# Patient Record
Sex: FEMALE | Race: ASIAN | NOT HISPANIC OR LATINO | Employment: FULL TIME | URBAN - METROPOLITAN AREA
[De-identification: names, ages, dates, MRNs, and addresses within clinical notes are randomized per-mention and may not be internally consistent; named-entity substitution may affect disease eponyms.]

---

## 2017-05-02 ENCOUNTER — OFFICE VISIT (OUTPATIENT)
Dept: OBSTETRICS AND GYNECOLOGY | Facility: CLINIC | Age: 47
End: 2017-05-02
Payer: COMMERCIAL

## 2017-05-02 VITALS
SYSTOLIC BLOOD PRESSURE: 124 MMHG | BODY MASS INDEX: 26.68 KG/M2 | TEMPERATURE: 99 F | DIASTOLIC BLOOD PRESSURE: 70 MMHG | WEIGHT: 150.56 LBS | HEIGHT: 63 IN

## 2017-05-02 DIAGNOSIS — Z12.31 VISIT FOR SCREENING MAMMOGRAM: ICD-10-CM

## 2017-05-02 DIAGNOSIS — Z01.419 WELL WOMAN EXAM WITH ROUTINE GYNECOLOGICAL EXAM: Primary | ICD-10-CM

## 2017-05-02 PROCEDURE — 88175 CYTOPATH C/V AUTO FLUID REDO: CPT

## 2017-05-02 PROCEDURE — 99386 PREV VISIT NEW AGE 40-64: CPT | Mod: S$GLB,,, | Performed by: OBSTETRICS & GYNECOLOGY

## 2017-05-02 PROCEDURE — 99999 PR PBB SHADOW E&M-NEW PATIENT-LVL III: CPT | Mod: PBBFAC,,, | Performed by: OBSTETRICS & GYNECOLOGY

## 2017-05-02 NOTE — MR AVS SNAPSHOT
"    [facility] - OB/ GYN  [address]  [place] [address]  Phone: [phone]                  Jacque Sanford   2017 11:20 AM   Office Visit    Description:  Female : 1970   Provider:  Anthony Fernandez MD   Department:  [facility] - OB/ GYN           Reason for Visit     Gynecologic Exam           Diagnoses this Visit        Comments    Well woman exam with routine gynecological exam    -  Primary     Visit for screening mammogram                To Do List           Goals (5 Years of Data)     None      Follow-Up and Disposition     Return in about 1 year (around 2018).      [facility]     [facility] On Call Nurse Care Line -  Assistance  Unless otherwise directed by your provider, please contact [facility], our nurse care line that is available for  assistance.     Registered nurses in the [facility] provide: appointment scheduling, clinical advisement, health education, and other advisory services.  Call: [phone] (toll free)               Medications           Message regarding Medications     Verify the changes and/or additions to your medication regime listed below are the same as discussed with your clinician today.  If any of these changes or additions are incorrect, please notify your healthcare provider.             Verify that the below list of medications is an accurate representation of the medications you are currently taking.  If none reported, the list may be blank. If incorrect, please contact your healthcare provider. Carry this list with you in case of emergency.                Clinical Reference Information           Your Vitals Were     BP Temp Height    124/70 (BP Location: Right arm, Patient Position: Sitting, BP Method: Manual) 98.6 °F (37 °C) (Oral) 5' 2.6" (1.59 m)    Weight Last Period BMI    68.3 kg (150 lb 9.2 oz) 04/15/2017 (Exact Date) 27.02 kg/m2      Blood Pressure          Most Recent Value    BP  124/70      Allergies as of " 5/2/2017     No Known Allergies      Immunizations Administered on Date of Encounter - 5/2/2017     None      Orders Placed During Today's Visit      Normal Orders This Visit    Liquid-based pap smear, screening     Future Labs/Procedures Expected by Expires    Mammo Digital Screening Bilat with CAD  5/2/2017 7/2/2018      Language Assistance Services     ATTENTION: Language assistance services are available, free of charge. Please call [phone].      ATENCIÓN: Si habla español, tiene a villarreal disposición servicios gratuitos de asistencia lingüística. Llame al [phone].     [facility] Ý: N?u b?n nói Ti?ng Vi?t, có các d?ch v? h? tr? ngôn ng? mi?n phí dành cho b?n. G?i s? [phone].         [facility] - [facility]/ [facility]YN complies with applicable Federal civil rights laws and does not discriminate on the basis of race, color, national origin, age, disability, or sex.

## 2017-05-02 NOTE — PROGRESS NOTES
Subjective:       Patient ID: Jacque Sanford is a 46 y.o. female.    Chief Complaint:  Gynecologic Exam      History of Present Illness  HPI  Annual Exam-Premenopausal  Patient presents for annual exam. The patient has no complaints today. The patient is sexually active. GYN screening history: no prior history of gyn screening tests. The patient wears seatbelts: yes. The patient participates in regular exercise: yes. Has the patient ever been transfused or tattooed?: no. The patient reports that there is not domestic violence in her life.    She has had an IUD from [place] in .  The device was supposed to last 6 years.         GYN & OB History  Patient's last menstrual period was 04/15/2017 (exact date).   Date of Last Pap: No result found    OB History    Para Term  AB SAB TAB Ectopic Multiple Living   4 2 2  2 2          # Outcome Date GA Lbr Daryl/2nd Weight Sex Delivery Anes PTL Lv   4 Term            3 Term            2 SAB            1 SAB                 History reviewed. No pertinent past medical history.    History reviewed. No pertinent surgical history.    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     No current facility-administered medications for this visit.        Review of patient's allergies indicates:  No Known Allergies    Review of Systems  Review of Systems    Constitutional: Negative for activity change, appetite change, chills, fatigue, fever and unexpected weight change.   HENT: Negative for mouth sores.    Respiratory: Negative for cough, shortness of breath and wheezing.    Cardiovascular: Negative for chest pain and palpitations.   Gastrointestinal: Negative for abdominal pain, bloating, blood in stool, constipation, nausea and vomiting.   Endocrine: Negative for diabetes and hot flashes.   Genitourinary: Negative for dyspareunia, dysuria, frequency, hematuria, menorrhagia, menstrual problem, pelvic pain, urgency, vaginal bleeding, vaginal discharge, vaginal pain, dysmenorrhea, urinary incontinence, postcoital bleeding and vaginal odor.   Musculoskeletal: Negative for back pain and myalgias.   Skin:  Negative for rash.   Neurological: Negative for seizures and headaches.   Psychiatric/Behavioral: Negative for depression and sleep disturbance. The patient is not nervous/anxious.    Breast: Negative for breast mass, breast pain and nipple discharge          Objective:    Physical Exam:   Constitutional: She appears well-developed and well-nourished. No distress.    HENT:   Head: Normocephalic and atraumatic.    Eyes: EOM are normal.    Neck: Normal range of motion.     Pulmonary/Chest: Effort normal. No respiratory distress.   Breasts: Non-tender, no engorgement, no masses, no retraction, no discharge. Negative for lymphadenopathy.         Abdominal: Soft. She exhibits no distension. There is no tenderness. There is no rebound and no guarding.     Genitourinary: Vagina normal and uterus normal. No vaginal discharge found.   Genitourinary Comments: Vulva without any obvious lesions.  Vaginal vault with good support.  Minimal yellow discharge noted.  No obvious lesion.  Cervix is without any cervical motion tenderness.  No obvious lesion.  IUD strings visible.  Uterus is small, retroverted, non-tender, normal contour.  Adnexa is without any masses or tenderness.            Musculoskeletal: Normal range of motion.       Neurological: She is alert.    Skin: Skin is warm and dry.    Psychiatric: She has a normal mood and affect.          Assessment:        1. Well woman exam with routine gynecological exam    2. Visit for screening mammogram       3.  IUD in utero        Plan:          I have discussed with the patient her condition.  Monthly breast examination was instructed, discussed, and encouraged.  Patient was encouraged to consume a low-calorie, low fat diet, and to increase of physical activity.  Healthy habits encouraged.  A Pap smear was performed.  Mammogram was ordered because of the combination of her age and risk factors.  Gonorrhea and Chlamydia testing not performed.  HIV test not ordered.  She will come back to see me in one year for her annual visit.  She can come back to see me sooner as necessary.  All of her questions were answered appropriately to her satisfaction.    She will be back for IUD removal and insertion.

## 2019-04-26 ENCOUNTER — OFFICE VISIT (OUTPATIENT)
Dept: OBSTETRICS AND GYNECOLOGY | Facility: CLINIC | Age: 49
End: 2019-04-26
Payer: COMMERCIAL

## 2019-04-26 ENCOUNTER — HOSPITAL ENCOUNTER (OUTPATIENT)
Dept: RADIOLOGY | Facility: HOSPITAL | Age: 49
Discharge: HOME | End: 2019-04-26
Attending: OBSTETRICS & GYNECOLOGY
Payer: COMMERCIAL

## 2019-04-26 VITALS
BODY MASS INDEX: 27.07 KG/M2 | WEIGHT: 152.75 LBS | SYSTOLIC BLOOD PRESSURE: 120 MMHG | DIASTOLIC BLOOD PRESSURE: 74 MMHG | HEIGHT: 63 IN

## 2019-04-26 DIAGNOSIS — Z01.419 WELL WOMAN EXAM WITH ROUTINE GYNECOLOGICAL EXAM: Primary | ICD-10-CM

## 2019-04-26 DIAGNOSIS — Z12.31 SCREENING MAMMOGRAM, ENCOUNTER FOR: ICD-10-CM

## 2019-04-26 PROCEDURE — 77067 SCR MAMMO BI INCL CAD: CPT | Mod: 26,,, | Performed by: RADIOLOGY

## 2019-04-26 PROCEDURE — 77063 MAMMO DIGITAL SCREENING BILAT WITH TOMOSYNTHESIS_CAD: ICD-10-PCS | Mod: 26,,, | Performed by: RADIOLOGY

## 2019-04-26 PROCEDURE — 77067 SCR MAMMO BI INCL CAD: CPT | Mod: TC

## 2019-04-26 PROCEDURE — 77063 BREAST TOMOSYNTHESIS BI: CPT | Mod: 26,,, | Performed by: RADIOLOGY

## 2019-04-26 PROCEDURE — 99396 PR PREVENTIVE VISIT,EST,40-64: ICD-10-PCS | Mod: S$GLB,,, | Performed by: OBSTETRICS & GYNECOLOGY

## 2019-04-26 PROCEDURE — 99999 PR PBB SHADOW E&M-EST. PATIENT-LVL III: CPT | Mod: PBBFAC,,, | Performed by: OBSTETRICS & GYNECOLOGY

## 2019-04-26 PROCEDURE — 99999 PR PBB SHADOW E&M-EST. PATIENT-LVL III: ICD-10-PCS | Mod: PBBFAC,,, | Performed by: OBSTETRICS & GYNECOLOGY

## 2019-04-26 PROCEDURE — 99396 PREV VISIT EST AGE 40-64: CPT | Mod: S$GLB,,, | Performed by: OBSTETRICS & GYNECOLOGY

## 2019-04-26 PROCEDURE — 77067 MAMMO DIGITAL SCREENING BILAT WITH TOMOSYNTHESIS_CAD: ICD-10-PCS | Mod: 26,,, | Performed by: RADIOLOGY

## 2019-04-26 NOTE — PROGRESS NOTES
"  Subjective:       Patient ID: Jacque Sanford is a 48 y.o. female.    Chief Complaint:  Gynecologic Exam (Last pap was 17)      History of Present Illness  HPI  Annual Exam-Premenopausal  Patient presents for annual exam. The patient has no complaints today. The patient is sexually active. GYN screening history: last pap: approximate date 2017 and was normal. No mammogram on file.  The patient wears seatbelts: yes. The patient participates in regular exercise: yes. Has the patient ever been transfused or tattooed?: no. The patient reports that there is not domestic violence in her life.    Has had an IUD from [place] since .  Prolonged menses up to 10 days every month.   Back to [place] last month.  "The doctor over there told (her) to keep the IUD in until (she) stops having cycle".    No other complaint.      GYN & OB History  Patient's last menstrual period was 2019 (exact date).   Date of Last Pap: 2017    OB History    Para Term  AB Living   4 2 2   2     SAB TAB Ectopic Multiple Live Births   2              # Outcome Date GA Lbr Daryl/2nd Weight Sex Delivery Anes PTL Lv   4 Term            3 Term            2 SAB            1 SAB              History reviewed. No pertinent past medical history.    History reviewed. No pertinent surgical history.    Family History   Problem Relation Age of Onset    Diabetes Father     Hypertension Father        Social History     Socioeconomic History    Marital status:      Spouse name: Not on file    Number of children: Not on file    Years of education: Not on file    Highest education level: Not on file   Occupational History    Not on file   Social Needs    Financial resource strain: Not on file    Food insecurity:     Worry: Not on file     Inability: Not on file    Transportation needs:     Medical: Not on file     Non-medical: Not on file   Tobacco Use    Smoking status: Never Smoker    Smokeless tobacco: Never Used "   Substance and Sexual Activity    Alcohol use: No    Drug use: No    Sexual activity: Yes     Partners: Male     Birth control/protection: IUD   Lifestyle    Physical activity:     Days per week: Not on file     Minutes per session: Not on file    Stress: Not on file   Relationships    Social connections:     Talks on phone: Not on file     Gets together: Not on file     Attends Religion service: Not on file     Active member of club or organization: Not on file     Attends meetings of clubs or organizations: Not on file     Relationship status: Not on file   Other Topics Concern    Not on file   Social History Narrative     since 1993    He is a     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       No current facility-administered medications for this visit.        Review of patient's allergies indicates:  No Known Allergies  Review of Systems  Review of Systems   Constitutional: Negative for activity change, appetite change, chills, fatigue, fever and unexpected weight change.   HENT: Negative for mouth sores.    Respiratory: Negative for cough, shortness of breath and wheezing.    Cardiovascular: Negative for chest pain and palpitations.   Gastrointestinal: Negative for abdominal pain, bloating, blood in stool, constipation, nausea and vomiting.   Endocrine: Negative for diabetes and hot flashes.   Genitourinary: Positive for menstrual problem and vaginal bleeding. Negative for dysmenorrhea, dyspareunia, dysuria, frequency, hematuria, menorrhagia, pelvic pain, urgency, vaginal discharge, vaginal pain, urinary incontinence, postcoital bleeding and vaginal odor.   Musculoskeletal: Negative for back pain and myalgias.   Integumentary:  Negative for rash, breast mass and nipple discharge.   Neurological: Negative for seizures and headaches.    Psychiatric/Behavioral: Negative for depression and sleep disturbance. The patient is not nervous/anxious.    Breast: Negative for mass, mastodynia and nipple discharge          Objective:    Physical Exam:   Constitutional: She appears well-developed and well-nourished. No distress.    HENT:   Head: Normocephalic and atraumatic.    Eyes: EOM are normal.    Neck: Normal range of motion.     Pulmonary/Chest: Effort normal. No respiratory distress.   Breasts: Non-tender, no engorgement, no masses, no retraction, no discharge. Negative for lymphadenopathy.         Abdominal: Soft. She exhibits no distension. There is no tenderness. There is no rebound and no guarding.     Genitourinary: Uterus normal. Vaginal discharge found.   Genitourinary Comments: Vulva without any obvious lesions.  Vaginal vault with good support.  Minimal bloody discharge noted.  No obvious lesion.  Cervix is without any cervical motion tenderness.  No obvious lesion.  IUD strings visible.  Appears to be ParaGard.  Uterus is small, non-tender, normal contour.  Adnexa is without any masses or tenderness.           Musculoskeletal: Normal range of motion.       Neurological: She is alert.    Skin: Skin is warm and dry.    Psychiatric: She has a normal mood and affect.          Assessment:        1. Well woman exam with routine gynecological exam    2. Screening mammogram, encounter for    3.  IUD in uterus         Plan:          I have discussed with the patient her condition.  Monthly breast examination was instructed, discussed, and encouraged.  Patient was encouraged to consume a low-calorie, low fat diet, and to increase of physical activity.  Healthy habits encouraged.  A Pap smear was NOT performed according to the USPSTF recommendations.  Mammogram was ordered because of the combination of her age and risk factors, according to ACOG guidelines.  Gonorrhea and Chlamydia testing not performed;  HIV test not ordered, again according to  guidelines.  Colonoscopy discussed according to ACS guideline.  We also discussed her obstetric history and CV risks.   Patient is to continue her medications as prescribed.  She will come back to see me in one year for her annual visit.  She can come back to see me sooner as necessary.  All of her questions were answered appropriately to her satisfaction.    Most likely, she can keep her IUD until menopause.

## 2021-04-07 ENCOUNTER — OFFICE VISIT (OUTPATIENT)
Dept: PULMONOLOGY | Facility: CLINIC | Age: 51
End: 2021-04-07
Payer: COMMERCIAL

## 2021-04-07 ENCOUNTER — HOSPITAL ENCOUNTER (OUTPATIENT)
Dept: RADIOLOGY | Facility: HOSPITAL | Age: 51
Discharge: HOME | End: 2021-04-07
Attending: INTERNAL MEDICINE
Payer: COMMERCIAL

## 2021-04-07 VITALS
OXYGEN SATURATION: 95 % | BODY MASS INDEX: 29.62 KG/M2 | HEART RATE: 71 BPM | DIASTOLIC BLOOD PRESSURE: 85 MMHG | TEMPERATURE: 98 F | SYSTOLIC BLOOD PRESSURE: 145 MMHG | WEIGHT: 160.94 LBS | HEIGHT: 62 IN

## 2021-04-07 DIAGNOSIS — R09.81 NASAL CONGESTION: ICD-10-CM

## 2021-04-07 DIAGNOSIS — R05.3 CHRONIC COUGH: Primary | ICD-10-CM

## 2021-04-07 DIAGNOSIS — R05.3 CHRONIC COUGH: ICD-10-CM

## 2021-04-07 PROCEDURE — 71046 X-RAY EXAM CHEST 2 VIEWS: CPT | Mod: TC,FY

## 2021-04-07 PROCEDURE — 3008F PR BODY MASS INDEX (BMI) DOCUMENTED: ICD-10-PCS | Mod: CPTII,S$GLB,, | Performed by: INTERNAL MEDICINE

## 2021-04-07 PROCEDURE — 71046 X-RAY EXAM CHEST 2 VIEWS: CPT | Mod: 26,,, | Performed by: RADIOLOGY

## 2021-04-07 PROCEDURE — 99204 PR OFFICE/OUTPT VISIT, NEW, LEVL IV, 45-59 MIN: ICD-10-PCS | Mod: S$GLB,,, | Performed by: INTERNAL MEDICINE

## 2021-04-07 PROCEDURE — 99999 PR PBB SHADOW E&M-EST. PATIENT-LVL IV: ICD-10-PCS | Mod: PBBFAC,,, | Performed by: INTERNAL MEDICINE

## 2021-04-07 PROCEDURE — 1126F PR PAIN SEVERITY QUANTIFIED, NO PAIN PRESENT: ICD-10-PCS | Mod: S$GLB,,, | Performed by: INTERNAL MEDICINE

## 2021-04-07 PROCEDURE — 71046 XR CHEST PA AND LATERAL: ICD-10-PCS | Mod: 26,,, | Performed by: RADIOLOGY

## 2021-04-07 PROCEDURE — 3008F BODY MASS INDEX DOCD: CPT | Mod: CPTII,S$GLB,, | Performed by: INTERNAL MEDICINE

## 2021-04-07 PROCEDURE — 99204 OFFICE O/P NEW MOD 45 MIN: CPT | Mod: S$GLB,,, | Performed by: INTERNAL MEDICINE

## 2021-04-07 PROCEDURE — 1126F AMNT PAIN NOTED NONE PRSNT: CPT | Mod: S$GLB,,, | Performed by: INTERNAL MEDICINE

## 2021-04-07 PROCEDURE — 99999 PR PBB SHADOW E&M-EST. PATIENT-LVL IV: CPT | Mod: PBBFAC,,, | Performed by: INTERNAL MEDICINE

## 2021-04-07 RX ORDER — ALBUTEROL SULFATE 90 UG/1
2 AEROSOL, METERED RESPIRATORY (INHALATION) EVERY 6 HOURS PRN
Qty: 18 G | Refills: 0 | Status: SHIPPED | OUTPATIENT
Start: 2021-04-07 | End: 2023-08-29

## 2021-04-07 RX ORDER — PREDNISONE 20 MG/1
40 TABLET ORAL DAILY
Qty: 10 TABLET | Refills: 0 | Status: SHIPPED | OUTPATIENT
Start: 2021-04-07 | End: 2021-04-12

## 2021-04-07 RX ORDER — PROMETHAZINE HYDROCHLORIDE AND DEXTROMETHORPHAN HYDROBROMIDE 6.25; 15 MG/5ML; MG/5ML
SYRUP ORAL
COMMUNITY
Start: 2021-02-23 | End: 2023-08-29

## 2021-04-07 RX ORDER — ERGOCALCIFEROL 1.25 MG/1
50000 CAPSULE ORAL
COMMUNITY
Start: 2021-02-23

## 2021-04-08 ENCOUNTER — TELEPHONE (OUTPATIENT)
Dept: PULMONOLOGY | Facility: CLINIC | Age: 51
End: 2021-04-08

## 2021-05-03 ENCOUNTER — TELEPHONE (OUTPATIENT)
Dept: OBSTETRICS AND GYNECOLOGY | Facility: CLINIC | Age: 51
End: 2021-05-03

## 2021-05-03 DIAGNOSIS — Z12.31 SCREENING MAMMOGRAM, ENCOUNTER FOR: Primary | ICD-10-CM

## 2021-05-24 ENCOUNTER — HOSPITAL ENCOUNTER (OUTPATIENT)
Dept: RADIOLOGY | Facility: HOSPITAL | Age: 51
Discharge: HOME | End: 2021-05-24
Attending: OBSTETRICS & GYNECOLOGY
Payer: COMMERCIAL

## 2021-05-24 ENCOUNTER — OFFICE VISIT (OUTPATIENT)
Dept: OBSTETRICS AND GYNECOLOGY | Facility: CLINIC | Age: 51
End: 2021-05-24
Payer: COMMERCIAL

## 2021-05-24 VITALS
HEIGHT: 62 IN | DIASTOLIC BLOOD PRESSURE: 60 MMHG | WEIGHT: 162.25 LBS | SYSTOLIC BLOOD PRESSURE: 138 MMHG | BODY MASS INDEX: 29.86 KG/M2

## 2021-05-24 VITALS — WEIGHT: 160.94 LBS | HEIGHT: 62 IN | BODY MASS INDEX: 29.62 KG/M2

## 2021-05-24 DIAGNOSIS — Z12.31 SCREENING MAMMOGRAM, ENCOUNTER FOR: ICD-10-CM

## 2021-05-24 DIAGNOSIS — Z01.419 WELL WOMAN EXAM WITH ROUTINE GYNECOLOGICAL EXAM: Primary | ICD-10-CM

## 2021-05-24 PROCEDURE — 77067 MAMMO DIGITAL SCREENING BILAT WITH TOMO: ICD-10-PCS | Mod: 26,,, | Performed by: RADIOLOGY

## 2021-05-24 PROCEDURE — 77063 BREAST TOMOSYNTHESIS BI: CPT | Mod: 26,,, | Performed by: RADIOLOGY

## 2021-05-24 PROCEDURE — 99999 PR PBB SHADOW E&M-EST. PATIENT-LVL III: CPT | Mod: PBBFAC,,, | Performed by: OBSTETRICS & GYNECOLOGY

## 2021-05-24 PROCEDURE — 88175 CYTOPATH C/V AUTO FLUID REDO: CPT | Performed by: OBSTETRICS & GYNECOLOGY

## 2021-05-24 PROCEDURE — 3008F BODY MASS INDEX DOCD: CPT | Mod: CPTII,S$GLB,, | Performed by: OBSTETRICS & GYNECOLOGY

## 2021-05-24 PROCEDURE — 77067 SCR MAMMO BI INCL CAD: CPT | Mod: 26,,, | Performed by: RADIOLOGY

## 2021-05-24 PROCEDURE — 1126F PR PAIN SEVERITY QUANTIFIED, NO PAIN PRESENT: ICD-10-PCS | Mod: S$GLB,,, | Performed by: OBSTETRICS & GYNECOLOGY

## 2021-05-24 PROCEDURE — 3008F PR BODY MASS INDEX (BMI) DOCUMENTED: ICD-10-PCS | Mod: CPTII,S$GLB,, | Performed by: OBSTETRICS & GYNECOLOGY

## 2021-05-24 PROCEDURE — 1126F AMNT PAIN NOTED NONE PRSNT: CPT | Mod: S$GLB,,, | Performed by: OBSTETRICS & GYNECOLOGY

## 2021-05-24 PROCEDURE — 77063 MAMMO DIGITAL SCREENING BILAT WITH TOMO: ICD-10-PCS | Mod: 26,,, | Performed by: RADIOLOGY

## 2021-05-24 PROCEDURE — 99396 PR PREVENTIVE VISIT,EST,40-64: ICD-10-PCS | Mod: S$GLB,,, | Performed by: OBSTETRICS & GYNECOLOGY

## 2021-05-24 PROCEDURE — 77067 SCR MAMMO BI INCL CAD: CPT | Mod: TC

## 2021-05-24 PROCEDURE — 99999 PR PBB SHADOW E&M-EST. PATIENT-LVL III: ICD-10-PCS | Mod: PBBFAC,,, | Performed by: OBSTETRICS & GYNECOLOGY

## 2021-05-24 PROCEDURE — 99396 PREV VISIT EST AGE 40-64: CPT | Mod: S$GLB,,, | Performed by: OBSTETRICS & GYNECOLOGY

## 2021-05-24 PROCEDURE — 87624 HPV HI-RISK TYP POOLED RSLT: CPT | Performed by: OBSTETRICS & GYNECOLOGY

## 2021-05-28 LAB
FINAL PATHOLOGIC DIAGNOSIS: NORMAL
Lab: NORMAL

## 2021-05-31 LAB
HPV HR 12 DNA SPEC QL NAA+PROBE: NEGATIVE
HPV16 AG SPEC QL: NEGATIVE
HPV18 DNA SPEC QL NAA+PROBE: NEGATIVE

## 2021-08-16 ENCOUNTER — CLINICAL SUPPORT (OUTPATIENT)
Dept: URGENT CARE | Facility: CLINIC | Age: 51
End: 2021-08-16
Payer: COMMERCIAL

## 2021-08-16 DIAGNOSIS — Z20.822 ENCOUNTER FOR LABORATORY TESTING FOR COVID-19 VIRUS: Primary | ICD-10-CM

## 2021-08-16 LAB
CTP QC/QA: YES
SARS-COV-2 RDRP RESP QL NAA+PROBE: NEGATIVE

## 2021-08-16 PROCEDURE — U0002: ICD-10-PCS | Mod: QW,S$GLB,, | Performed by: PHYSICIAN ASSISTANT

## 2021-08-16 PROCEDURE — U0002 COVID-19 LAB TEST NON-CDC: HCPCS | Mod: QW,S$GLB,, | Performed by: PHYSICIAN ASSISTANT

## 2023-07-17 ENCOUNTER — HOSPITAL ENCOUNTER (OUTPATIENT)
Dept: RADIOLOGY | Facility: HOSPITAL | Age: 53
Discharge: HOME | End: 2023-07-17
Attending: ORTHOPAEDIC SURGERY
Payer: COMMERCIAL

## 2023-07-17 ENCOUNTER — OFFICE VISIT (OUTPATIENT)
Dept: SPORTS MEDICINE | Facility: CLINIC | Age: 53
End: 2023-07-17
Payer: COMMERCIAL

## 2023-07-17 VITALS
WEIGHT: 162 LBS | SYSTOLIC BLOOD PRESSURE: 112 MMHG | HEART RATE: 61 BPM | DIASTOLIC BLOOD PRESSURE: 71 MMHG | BODY MASS INDEX: 29.63 KG/M2

## 2023-07-17 DIAGNOSIS — G89.29 CHRONIC PAIN OF BOTH KNEES: ICD-10-CM

## 2023-07-17 DIAGNOSIS — M25.561 CHRONIC PAIN OF BOTH KNEES: ICD-10-CM

## 2023-07-17 DIAGNOSIS — M17.10 PATELLOFEMORAL ARTHRITIS: Primary | ICD-10-CM

## 2023-07-17 DIAGNOSIS — M25.562 CHRONIC PAIN OF BOTH KNEES: ICD-10-CM

## 2023-07-17 PROCEDURE — 99999 PR PBB SHADOW E&M-EST. PATIENT-LVL III: ICD-10-PCS | Mod: PBBFAC,,, | Performed by: ORTHOPAEDIC SURGERY

## 2023-07-17 PROCEDURE — 99204 OFFICE O/P NEW MOD 45 MIN: CPT | Mod: 25,S$GLB,, | Performed by: ORTHOPAEDIC SURGERY

## 2023-07-17 PROCEDURE — 3078F PR MOST RECENT DIASTOLIC BLOOD PRESSURE < 80 MM HG: ICD-10-PCS | Mod: CPTII,S$GLB,, | Performed by: ORTHOPAEDIC SURGERY

## 2023-07-17 PROCEDURE — 4010F ACE/ARB THERAPY RXD/TAKEN: CPT | Mod: CPTII,S$GLB,, | Performed by: ORTHOPAEDIC SURGERY

## 2023-07-17 PROCEDURE — 99204 PR OFFICE/OUTPT VISIT, NEW, LEVL IV, 45-59 MIN: ICD-10-PCS | Mod: 25,S$GLB,, | Performed by: ORTHOPAEDIC SURGERY

## 2023-07-17 PROCEDURE — 73564 XR KNEE ORTHO BILAT WITH FLEXION: ICD-10-PCS | Mod: 26,,, | Performed by: RADIOLOGY

## 2023-07-17 PROCEDURE — 3008F BODY MASS INDEX DOCD: CPT | Mod: CPTII,S$GLB,, | Performed by: ORTHOPAEDIC SURGERY

## 2023-07-17 PROCEDURE — 99999 PR PBB SHADOW E&M-EST. PATIENT-LVL III: CPT | Mod: PBBFAC,,, | Performed by: ORTHOPAEDIC SURGERY

## 2023-07-17 PROCEDURE — 3074F SYST BP LT 130 MM HG: CPT | Mod: CPTII,S$GLB,, | Performed by: ORTHOPAEDIC SURGERY

## 2023-07-17 PROCEDURE — 20610 LARGE JOINT ASPIRATION/INJECTION: L KNEE: ICD-10-PCS | Mod: LT,S$GLB,, | Performed by: ORTHOPAEDIC SURGERY

## 2023-07-17 PROCEDURE — 3008F PR BODY MASS INDEX (BMI) DOCUMENTED: ICD-10-PCS | Mod: CPTII,S$GLB,, | Performed by: ORTHOPAEDIC SURGERY

## 2023-07-17 PROCEDURE — 3074F PR MOST RECENT SYSTOLIC BLOOD PRESSURE < 130 MM HG: ICD-10-PCS | Mod: CPTII,S$GLB,, | Performed by: ORTHOPAEDIC SURGERY

## 2023-07-17 PROCEDURE — 20610 DRAIN/INJ JOINT/BURSA W/O US: CPT | Mod: LT,S$GLB,, | Performed by: ORTHOPAEDIC SURGERY

## 2023-07-17 PROCEDURE — 4010F PR ACE/ARB THEARPY RXD/TAKEN: ICD-10-PCS | Mod: CPTII,S$GLB,, | Performed by: ORTHOPAEDIC SURGERY

## 2023-07-17 PROCEDURE — 73564 X-RAY EXAM KNEE 4 OR MORE: CPT | Mod: TC,50

## 2023-07-17 PROCEDURE — 73564 X-RAY EXAM KNEE 4 OR MORE: CPT | Mod: 26,,, | Performed by: RADIOLOGY

## 2023-07-17 PROCEDURE — 3078F DIAST BP <80 MM HG: CPT | Mod: CPTII,S$GLB,, | Performed by: ORTHOPAEDIC SURGERY

## 2023-07-17 RX ORDER — TRIAMCINOLONE ACETONIDE 40 MG/ML
40 INJECTION, SUSPENSION INTRA-ARTICULAR; INTRAMUSCULAR
Status: DISCONTINUED | OUTPATIENT
Start: 2023-07-17 | End: 2023-07-17 | Stop reason: HOSPADM

## 2023-07-17 RX ORDER — MELOXICAM 15 MG/1
15 TABLET ORAL DAILY
Qty: 30 TABLET | Refills: 0 | Status: SHIPPED | OUTPATIENT
Start: 2023-07-17

## 2023-07-17 RX ADMIN — TRIAMCINOLONE ACETONIDE 40 MG: 40 INJECTION, SUSPENSION INTRA-ARTICULAR; INTRAMUSCULAR at 10:07

## 2023-07-17 NOTE — PROGRESS NOTES
NEW PATIENT    HISTORY OF PRESENT ILLNESS   52 y.o. 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 She states she is on her feet for at least 15 hours a day. She has been having bilateral knee pain for a few months now. She reports some swelling. She has pain while standing and stair climbing. She has not had any previous treatment.        - mechanical symptoms, - instability    Is affecting ADLs.  Pain is 7/10 at it's worst.        PAST MEDICAL HISTORY    No past medical history on file.    PAST SURGICAL HISTORY     No past surgical history on file.    FAMILY HISTORY    Family History   Problem Relation Age of Onset    Diabetes Father     Hypertension Father        SOCIAL HISTORY    Social History     Socioeconomic History    Marital status:    Tobacco Use    Smoking status: Never    Smokeless tobacco: Never   Substance and Sexual Activity    Alcohol use: No    Drug use: No    Sexual activity: Yes     Partners: Male     Birth control/protection: I.U.D.   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 MAY CAUSE DROWSINESS, Disp: , Rfl:     ALLERGIES     Review of patient's allergies indicates:  No Known Allergies      REVIEW OF SYSTEMS   Constitution: Negative. Negative for chills, fever and night sweats.   HENT: Negative for congestion and headaches.    Eyes: Negative for blurred  vision, left vision loss and right vision loss.   Cardiovascular: Negative for chest pain and syncope.   Respiratory: Negative for cough and shortness of breath.    Endocrine: Negative for polydipsia, polyphagia and polyuria.   Hematologic/Lymphatic: Negative for bleeding problem. Does not bruise/bleed easily.   Skin: Negative for dry skin, itching and rash.   Musculoskeletal: Negative for falls. Positive for bilateral knee pain  Gastrointestinal: Negative for abdominal pain and bowel incontinence.   Genitourinary: Negative for bladder incontinence and nocturia.   Neurological: Negative for disturbances in coordination, loss of balance and seizures.   Psychiatric/Behavioral: Negative for depression. The patient does not have insomnia.    Allergic/Immunologic: Negative for hives and persistent infections.     PHYSICAL EXAMINATION    Vitals: /71   Pulse 61   Wt 73.5 kg (162 lb)   BMI 29.63 kg/m²     General: The patient appears active and healthy with no apparent physical problems.  No disturbance of mood or affect is demonstrated. Alert and Oriented.    HEENT: Eyes normal, pupils equally round, nose normal.    Resp: Equal and symmetrical chest rises. No wheezing    CV: Regular rate    Neck: Supple; nonpainful range of motion.    Extremities: no cyanosis, clubbing, edema, or diffuse swelling.  Palpable pulses, good capillary refill of the digits.  No coolness, discoloration, edema or obvious varicosities.    Skin: no lesions noted.    Lymphatic: no detected adenopathy in the upper or lower extremities.    Neurologic: normal mental status, normal reflexes, normal gait and balance.  Patient is alert and oriented to person, place and time.  No flaccidity or spasticity is noted.  No motor or sensory deficits are noted.  Light touch is intact    Orthopaedic:     KNEE EXAM - LEFT    Inspection:   Normal skin color and appearance with no scars, no ecchymosis and no effusion.      ROM:   0° - 145°.      Palpation:    There is no tenderness along medial plica, medial collateral ligament, pes bursa, iliotibial band, lateral collateral ligament, popliteal fossa, patellar tendon, or quadriceps tendon. Tender medial and lateral joint line    Stability: - anterior drawer, - Lachman, - pivot shift and - posterior drawer.      No instability with varus or valgus stress at 0° or 30°. Negative dial  test at 30° and 90°.    Tests:   - Elzbietas test.  - patellar compression - grind test, + patellofemoral crepitus.  There is no patellar apprehension.     - J Sign. - Karlee's. - patellar tilt. - June. Lateral patella translation  2 quadrants. Medial patella translation 2 quadrants    Motor:   Quadriceps strength is 5/5 and hamstrings strength is 5/5. Hamstrings show no tightness.      Neuro:   Distal neurovascular status is normal    Vascular: Negative Homans and no palpable popliteal cords. 2+ pedal pulse with brisk cap refill    Gait Normal    KNEE EXAM - RIGHT    Inspection:   Normal skin color and appearance with no scars, no ecchymosis and no effusion.      ROM:   0° - 145°.      Palpation:   There is no tenderness along medial plica, medial collateral ligament, pes bursa, iliotibial band, lateral collateral ligament, popliteal fossa, patellar tendon, or quadriceps tendon. Tender medial and lateral joint line    Stability: - anterior drawer, - Lachman, - pivot shift and - posterior drawer.      No instability with varus or valgus stress at 0° or 30°. Negative dial  test at 30° and 90°.    Tests:   - Elzbietas test.  - patellar compression - grind test, + patellofemoral crepitus.  There is no patellar apprehension.     - J Sign. - Karlee's. - patellar tilt. - June. Lateral patella translation  2 quadrants. Medial patella translation 2 quadrants    Motor:   Quadriceps strength is 5/5 and hamstrings strength is 5/5. Hamstrings show no tightness.      Neuro:   Distal neurovascular status is normal    Vascular: Negative Homans and no  palpable popliteal cords. 2+ pedal pulse with brisk cap refill    Gait Normal    IMAGING    X-ray Knee Ortho Bilateral with Flexion  Narrative: EXAMINATION:  XR KNEE ORTHO BILAT WITH FLEXION    CLINICAL HISTORY:  Pain in right knee    TECHNIQUE:  AP standing of both knees, PA flexion standing views of both knees, and Merchant views of both knees were performed.  Lateral views of both knees were also performed.    COMPARISON:  None    FINDINGS:  Joint spaces are maintained.  Bony structures are intact and no bony abnormalities seen.  Impression: See above    Electronically signed by: Frandy Gomez MD  Date:    07/17/2023  Time:    10:46        IMPRESSION       ICD-10-CM ICD-9-CM   1. Patellofemoral arthritis  M17.10 716.96   2. Chronic pain of both knees  M25.561 719.46    M25.562 338.29    G89.29        MEDICATIONS PRESCRIBED      Mobic 15mg    RECOMMENDATIONS     CSI of left knee performed in clinic  Referral to physical therapy  RTC in 6 weeks with Giovanni Coombs PA-C    Large Joint Aspiration/Injection: L knee    Date/Time: 7/17/2023 10:00 AM  Performed by: Fan Ruiz MD  Authorized by: Fan Ruiz MD     Consent Done?:  Yes (Verbal)  Indications:  Pain  Site marked: the procedure site was marked    Timeout: prior to procedure the correct patient, procedure, and site was verified    Prep: patient was prepped and draped in usual sterile fashion      Local anesthesia used?: Yes    Local anesthetic:  Co-phenylcaine spray (0.2% Naropin)  Anesthetic total (ml):  4      Details:  Needle Size:  22 G  Ultrasonic Guidance for needle placement?: No    Approach:  Anterolateral  Location:  Knee  Site:  L knee  Medications:  40 mg triamcinolone acetonide 40 mg/mL  Medications comment:  5 mL LIDOcaine HCL 10 mg/ml (1%) 10 mg/mL (1 %)  Patient tolerance:  Patient tolerated the procedure well with no immediate complications        All questions were answered, pt will contact us for questions or concerns in the  interim.

## 2023-07-27 ENCOUNTER — HOSPITAL ENCOUNTER (EMERGENCY)
Facility: HOSPITAL | Age: 53
Discharge: HOME | End: 2023-07-28
Attending: EMERGENCY MEDICINE
Payer: COMMERCIAL

## 2023-07-27 DIAGNOSIS — N70.11 HYDROSALPINX: ICD-10-CM

## 2023-07-27 DIAGNOSIS — R50.9 FEVER: ICD-10-CM

## 2023-07-27 DIAGNOSIS — U07.1 COVID-19: Primary | ICD-10-CM

## 2023-07-27 DIAGNOSIS — R63.0 DECREASED APPETITE: ICD-10-CM

## 2023-07-27 DIAGNOSIS — R82.998 LEUKOCYTES IN URINE: ICD-10-CM

## 2023-07-27 DIAGNOSIS — R11.0 NAUSEA: ICD-10-CM

## 2023-07-27 DIAGNOSIS — N30.01 ACUTE CYSTITIS WITH HEMATURIA: ICD-10-CM

## 2023-07-27 DIAGNOSIS — R10.9 ABDOMINAL PAIN, UNSPECIFIED ABDOMINAL LOCATION: ICD-10-CM

## 2023-07-27 DIAGNOSIS — K52.9 ILEITIS: ICD-10-CM

## 2023-07-27 LAB
ALBUMIN SERPL-MCNC: 4 G/DL (ref 3.3–5.5)
ALBUMIN SERPL-MCNC: 4.1 G/DL (ref 3.3–5.5)
ALLENS TEST: ABNORMAL
ALP SERPL-CCNC: 79 U/L (ref 42–141)
ALP SERPL-CCNC: 91 U/L (ref 42–141)
B-HCG UR QL: NEGATIVE
BILIRUB SERPL-MCNC: 0.8 MG/DL (ref 0.2–1.6)
BILIRUB SERPL-MCNC: 0.9 MG/DL (ref 0.2–1.6)
BILIRUBIN, POC UA: NEGATIVE
BLOOD, POC UA: ABNORMAL
BUN SERPL-MCNC: 20 MG/DL (ref 7–22)
CALCIUM SERPL-MCNC: 9.8 MG/DL (ref 8–10.3)
CHLORIDE SERPL-SCNC: 104 MMOL/L (ref 98–108)
CLARITY, POC UA: ABNORMAL
COLOR, POC UA: YELLOW
CREAT SERPL-MCNC: 0.9 MG/DL (ref 0.6–1.2)
CTP QC/QA: YES
CTP QC/QA: YES
GLUCOSE SERPL-MCNC: 129 MG/DL (ref 73–118)
GLUCOSE, POC UA: NEGATIVE
HCO3 UR-SCNC: 24.6 MMOL/L (ref 24–28)
HCT, POC: NORMAL
HGB, POC: NORMAL (ref 14–18)
INFLUENZA A ANTIGEN, POC: NEGATIVE
INFLUENZA B ANTIGEN, POC: NEGATIVE
KETONES, POC UA: NEGATIVE
LDH SERPL L TO P-CCNC: 1.12 MMOL/L (ref 0.5–2.2)
LEUKOCYTE EST, POC UA: ABNORMAL
MCH, POC: NORMAL
MCHC, POC: NORMAL
MCV, POC: NORMAL
MPV, POC: NORMAL
NITRITE, POC UA: NEGATIVE
PCO2 BLDA: 31.9 MMHG (ref 35–45)
PH SMN: 7.5 [PH] (ref 7.35–7.45)
PH UR STRIP: 7.5 [PH]
PO2 BLDA: 31 MMHG (ref 40–60)
POC ALT (SGPT): 47 U/L (ref 10–47)
POC ALT (SGPT): 65 U/L (ref 10–47)
POC AMYLASE: 33 U/L (ref 14–97)
POC AST (SGOT): 54 U/L (ref 11–38)
POC AST (SGOT): 57 U/L (ref 11–38)
POC BE: 2 MMOL/L
POC GGT: 43 U/L (ref 5–65)
POC PLATELET COUNT: NORMAL
POC RAPID STREP A: NEGATIVE
POC SATURATED O2: 66 % (ref 95–100)
POC TCO2: 25 MMOL/L (ref 18–33)
POC TCO2: 26 MMOL/L (ref 24–29)
POTASSIUM BLD-SCNC: 4.1 MMOL/L (ref 3.6–5.1)
PROTEIN, POC UA: ABNORMAL
PROTEIN, POC: 8.1 G/DL (ref 6.4–8.1)
PROTEIN, POC: 8.3 G/DL (ref 6.4–8.1)
RBC, POC: NORMAL
RDW, POC: NORMAL
SAMPLE: ABNORMAL
SARS-COV-2 RDRP RESP QL NAA+PROBE: POSITIVE
SITE: ABNORMAL
SODIUM BLD-SCNC: 137 MMOL/L (ref 128–145)
SPECIFIC GRAVITY, POC UA: 1.01
UROBILINOGEN, POC UA: 1 E.U./DL
WBC, POC: NORMAL

## 2023-07-27 PROCEDURE — 96361 HYDRATE IV INFUSION ADD-ON: CPT | Mod: ER

## 2023-07-27 PROCEDURE — 81514 NFCT DS BV&VAGINITIS DNA ALG: CPT | Performed by: EMERGENCY MEDICINE

## 2023-07-27 PROCEDURE — 87591 N.GONORRHOEAE DNA AMP PROB: CPT | Performed by: EMERGENCY MEDICINE

## 2023-07-27 PROCEDURE — 82150 ASSAY OF AMYLASE: CPT | Mod: ER

## 2023-07-27 PROCEDURE — 25000003 PHARM REV CODE 250: Mod: ER | Performed by: EMERGENCY MEDICINE

## 2023-07-27 PROCEDURE — 63600175 PHARM REV CODE 636 W HCPCS: Mod: ER | Performed by: EMERGENCY MEDICINE

## 2023-07-27 PROCEDURE — 82040 ASSAY OF SERUM ALBUMIN: CPT | Mod: 59,ER

## 2023-07-27 PROCEDURE — 25000003 PHARM REV CODE 250: Mod: ER

## 2023-07-27 PROCEDURE — 80053 COMPREHEN METABOLIC PANEL: CPT | Mod: ER

## 2023-07-27 PROCEDURE — 87040 BLOOD CULTURE FOR BACTERIA: CPT | Performed by: EMERGENCY MEDICINE

## 2023-07-27 PROCEDURE — 87086 URINE CULTURE/COLONY COUNT: CPT | Performed by: EMERGENCY MEDICINE

## 2023-07-27 PROCEDURE — 96375 TX/PRO/DX INJ NEW DRUG ADDON: CPT | Mod: ER

## 2023-07-27 PROCEDURE — 87635 SARS-COV-2 COVID-19 AMP PRB: CPT | Mod: ER

## 2023-07-27 PROCEDURE — 99285 EMERGENCY DEPT VISIT HI MDM: CPT | Mod: 25,ER

## 2023-07-27 PROCEDURE — 87804 INFLUENZA ASSAY W/OPTIC: CPT | Mod: ER

## 2023-07-27 PROCEDURE — 96365 THER/PROPH/DIAG IV INF INIT: CPT | Mod: 59,ER

## 2023-07-27 PROCEDURE — 81025 URINE PREGNANCY TEST: CPT | Mod: ER

## 2023-07-27 PROCEDURE — 25500020 PHARM REV CODE 255: Mod: ER

## 2023-07-27 PROCEDURE — 85025 COMPLETE CBC W/AUTO DIFF WBC: CPT | Mod: ER

## 2023-07-27 PROCEDURE — 83605 ASSAY OF LACTIC ACID: CPT | Mod: ER

## 2023-07-27 RX ORDER — IBUPROFEN 600 MG/1
600 TABLET ORAL
Status: COMPLETED | OUTPATIENT
Start: 2023-07-27 | End: 2023-07-27

## 2023-07-27 RX ORDER — MORPHINE SULFATE 4 MG/ML
4 INJECTION, SOLUTION INTRAMUSCULAR; INTRAVENOUS
Status: COMPLETED | OUTPATIENT
Start: 2023-07-27 | End: 2023-07-27

## 2023-07-27 RX ORDER — ACETAMINOPHEN 500 MG
1000 TABLET ORAL
Status: COMPLETED | OUTPATIENT
Start: 2023-07-27 | End: 2023-07-27

## 2023-07-27 RX ORDER — ONDANSETRON 2 MG/ML
4 INJECTION INTRAMUSCULAR; INTRAVENOUS
Status: COMPLETED | OUTPATIENT
Start: 2023-07-27 | End: 2023-07-27

## 2023-07-27 RX ADMIN — ONDANSETRON 4 MG: 2 INJECTION INTRAMUSCULAR; INTRAVENOUS at 11:07

## 2023-07-27 RX ADMIN — IBUPROFEN 600 MG: 600 TABLET ORAL at 06:07

## 2023-07-27 RX ADMIN — CEFTRIAXONE 2 G: 2 INJECTION, POWDER, FOR SOLUTION INTRAMUSCULAR; INTRAVENOUS at 11:07

## 2023-07-27 RX ADMIN — MORPHINE SULFATE 4 MG: 4 INJECTION INTRAVENOUS at 11:07

## 2023-07-27 RX ADMIN — ACETAMINOPHEN 1000 MG: 500 TABLET, FILM COATED ORAL at 06:07

## 2023-07-27 RX ADMIN — SODIUM CHLORIDE 1000 ML: 9 INJECTION, SOLUTION INTRAVENOUS at 09:07

## 2023-07-27 RX ADMIN — IOHEXOL 100 ML: 350 INJECTION, SOLUTION INTRAVENOUS at 09:07

## 2023-07-28 VITALS
HEART RATE: 65 BPM | SYSTOLIC BLOOD PRESSURE: 108 MMHG | RESPIRATION RATE: 20 BRPM | WEIGHT: 150 LBS | DIASTOLIC BLOOD PRESSURE: 64 MMHG | TEMPERATURE: 97 F | BODY MASS INDEX: 27.44 KG/M2 | OXYGEN SATURATION: 100 %

## 2023-07-28 LAB
ALLENS TEST: ABNORMAL
HCO3 UR-SCNC: 25.9 MMOL/L (ref 24–28)
LDH SERPL L TO P-CCNC: 1.28 MMOL/L (ref 0.5–2.2)
PCO2 BLDA: 47.5 MMHG (ref 35–45)
PH SMN: 7.34 [PH] (ref 7.35–7.45)
PO2 BLDA: 34 MMHG (ref 40–60)
POC BE: 0 MMOL/L
POC SATURATED O2: 62 % (ref 95–100)
POC TCO2: 27 MMOL/L (ref 24–29)
SAMPLE: ABNORMAL
SITE: ABNORMAL

## 2023-07-28 PROCEDURE — 83605 ASSAY OF LACTIC ACID: CPT | Mod: ER

## 2023-07-28 PROCEDURE — 25000003 PHARM REV CODE 250: Mod: ER | Performed by: EMERGENCY MEDICINE

## 2023-07-28 RX ORDER — ONDANSETRON 4 MG/1
4 TABLET, ORALLY DISINTEGRATING ORAL EVERY 4 HOURS PRN
Qty: 20 TABLET | Refills: 1 | Status: SHIPPED | OUTPATIENT
Start: 2023-07-28

## 2023-07-28 RX ORDER — CEPHALEXIN 500 MG/1
500 CAPSULE ORAL EVERY 8 HOURS
Qty: 30 CAPSULE | Refills: 0 | Status: SHIPPED | OUTPATIENT
Start: 2023-07-28 | End: 2023-08-07

## 2023-07-28 RX ORDER — ESOMEPRAZOLE MAGNESIUM 40 MG/1
40 CAPSULE, DELAYED RELEASE ORAL
Qty: 30 CAPSULE | Refills: 1 | Status: SHIPPED | OUTPATIENT
Start: 2023-07-28 | End: 2024-07-27

## 2023-07-28 RX ORDER — AMOXICILLIN 250 MG
2 CAPSULE ORAL DAILY PRN
Qty: 30 TABLET | Refills: 1 | Status: SHIPPED | OUTPATIENT
Start: 2023-07-28

## 2023-07-28 RX ORDER — DOXYCYCLINE 100 MG/1
100 CAPSULE ORAL 2 TIMES DAILY
Qty: 28 CAPSULE | Refills: 0 | Status: SHIPPED | OUTPATIENT
Start: 2023-07-28 | End: 2023-08-11

## 2023-07-28 RX ORDER — IBUPROFEN 600 MG/1
600 TABLET ORAL EVERY 6 HOURS PRN
Qty: 30 TABLET | Refills: 1 | OUTPATIENT
Start: 2023-07-28 | End: 2023-07-31

## 2023-07-28 RX ORDER — DOXYCYCLINE HYCLATE 100 MG
100 TABLET ORAL
Status: COMPLETED | OUTPATIENT
Start: 2023-07-28 | End: 2023-07-28

## 2023-07-28 RX ADMIN — DOXYCYCLINE HYCLATE 100 MG: 100 TABLET, COATED ORAL at 12:07

## 2023-07-28 NOTE — ED PROVIDER NOTES
Encounter Date: 7/27/2023       History     Chief Complaint   Patient presents with    Abdominal Pain    Fever     Pt presents to the ED with C/C of runny nose, fever, and abdominal pain, onset 2 days ago     51 yo female presents via friend with fever, chills, sweats, runny nose, and abdominal pain.  Patient had onset of fever, chills, sweats, and runny nose about 10 days ago, but developed acute severe generalized abdominal pain around 2 days ago.  Patient has been nauseated and has not wanted to eat for > 1 week, but she has not vomited.  She has not had a BM in 7 days, which she attributes to decreased food intake.  No urinary complaints.  No vaginal discharge.  Sexually active with  only; they have intercourse maybe every 1-2 months since patient is in menopause.  Patient has copper IUD placed 2010.  She is UTD on Pap smear; OB/GYN is Dr. Anthony Fernandez.     Patient is Kyrgyz-speaking.  Friend translated initially, but subsequently, [employer] translation used: [place], #[id number], and Ban, #[id number].      OB/GYN: Dr. Anthony Fernandez  PMD: Dr. Laurent Ramos      Review of patient's allergies indicates:  No Known Allergies  No past medical history on file.  No past surgical history on file.  Family History   Problem Relation Age of Onset    Diabetes Father     Hypertension Father      Social History     Tobacco Use    Smoking status: Never    Smokeless tobacco: Never   Substance Use Topics    Alcohol use: No    Drug use: No     Review of Systems   Constitutional:  Positive for appetite change, chills and fever.   HENT:  Positive for rhinorrhea.    Eyes:  Negative for photophobia.   Respiratory:  Negative for shortness of breath.    Cardiovascular:  Negative for chest pain.   Gastrointestinal:  Positive for abdominal pain and nausea. Negative for vomiting.   Genitourinary:  Negative for dysuria.   Musculoskeletal:  Negative for gait problem.   Skin:  Negative for rash.   Neurological:  Negative for syncope.     Physical Exam      Initial Vitals [07/27/23 1813]   BP Pulse Resp Temp SpO2   (!) 141/81 91 (!) 24 (!) 102.6 °F (39.2 °C) 98 %      MAP       --         Physical Exam    Nursing note and vitals reviewed.  Constitutional: She appears well-developed and well-nourished. She is not diaphoretic.   Awake, alert middle-aged female. Appears tired.   HENT:   Head: Normocephalic and atraumatic.   Eyes: Conjunctivae and EOM are normal. Pupils are equal, round, and reactive to light.   Neck: Neck supple.   Normal range of motion.  Cardiovascular:  Normal rate, regular rhythm and intact distal pulses.           Pulmonary/Chest: Breath sounds normal. No respiratory distress. She has no wheezes. She has no rhonchi. She has no rales.   Abdominal: Abdomen is soft. There is abdominal tenderness (diffuse, moderate). There is no rebound and no guarding.   Genitourinary:    Genitourinary Comments: Normal external genitalia without lesion. Cervical os with IUD string. Physiologic discharge. No adnexal tenderness or masses.     Musculoskeletal:         General: No tenderness or edema. Normal range of motion.      Cervical back: Normal range of motion and neck supple.     Neurological: She is alert and oriented to person, place, and time. She has normal strength.   Moving all extremities.   Skin: Skin is warm and dry. No erythema. No pallor.   Razor-thin shallow 3mm lacerations c/w cutting to face and abdomen.  Friend says this is from a procedure like cupping.   Psychiatric: She has a normal mood and affect.       ED Course   Procedures  Labs Reviewed   SARS-COV-2 RDRP GENE - Abnormal; Notable for the following components:       Result Value    POC Rapid COVID Positive (*)     All other components within normal limits    Narrative:     This test utilizes isothermal nucleic acid amplification technology to detect the SARS-CoV-2 RdRp nucleic acid segment. The analytical sensitivity (limit of detection) is 500 copies/swab.     A POSITIVE result is  indicative of the presence of SARS-CoV-2 RNA; clinical correlation with patient history and other diagnostic information is necessary to determine patient infection status.    A NEGATIVE result means that SARS-CoV-2 nucleic acids are not present above the limit of detection. A NEGATIVE result should be treated as presumptive. It does not rule out the possibility of COVID-19 and should not be the sole basis for treatment decisions. If COVID-19 is strongly suspected based on clinical and exposure history, re-testing using an alternate molecular assay should be considered.     This test is only for use under the Food and Drug Administration s Emergency Use Authorization (EUA).     Commercial kits are provided by [employer]. Performance characteristics of the EUA have been independently verified by [facility] Department of Pathology and Laboratory Medicine.   _________________________________________________________________   The authorized Fact Sheet for Healthcare Providers and the authorized Fact Sheet for Patients of the ID NOW COVID-19 are available on the FDA website:    https://www.fda.gov/media/914483/download      https://www.fda.gov/media/341596/download      POCT URINALYSIS W/O SCOPE - Abnormal; Notable for the following components:    Blood, UA 1+ (*)     Protein, UA 1+ (*)     Leukocytes, UA 3+ (*)     All other components within normal limits   ISTAT PROCEDURE - Abnormal; Notable for the following components:    POC PH 7.496 (*)     POC PCO2 31.9 (*)     POC PO2 31 (*)     POC SATURATED O2 66 (*)     All other components within normal limits   POCT CMP - Abnormal; Notable for the following components:    ALT (SGPT), POC 65 (*)     AST (SGOT), POC 57 (*)     POC Glucose 129 (*)     Protein, POC 8.3 (*)     All other components within normal limits   POCT LIVER PANEL - Abnormal; Notable for the following components:    AST (SGOT), POC 54 (*)     All other components within normal limits    ISTAT PROCEDURE - Abnormal; Notable for the following components:    POC PH 7.345 (*)     POC PCO2 47.5 (*)     POC PO2 34 (*)     POC SATURATED O2 62 (*)     All other components within normal limits   CULTURE, BLOOD    Narrative:     Aerobic and anaerobic   CULTURE, BLOOD    Narrative:     Aerobic and anaerobic   CULTURE, URINE    Narrative:     Indicated criteria for high risk culture:->Other  Other (specify):->UA   C. TRACHOMATIS/N. GONORRHOEAE BY AMP DNA   VAGINOSIS SCREEN BY DNA PROBE   POCT URINE PREGNANCY   POCT URINALYSIS W/O SCOPE   POCT CBC   POCT STREP A, RAPID   POCT RAPID INFLUENZA A/B   POCT CMP   POCT LIVER PANEL          Imaging Results              US Pelvis Comp with Transvag NON-OB (xpd) (Final result)  Result time 07/28/23 00:09:54   Procedure changed from US Pelvis Complete Non OB     Final result by Burton Jeffries MD (07/28/23 00:09:54)                   Impression:      Findings suggestive of right-sided hydrosalpinx versus pyosalpinx, similar when compared with recent CT.  Correlation with physical exam and risk factors would be helpful to exclude pyosalpinx in this patient with fever.  Suggest gynecologic follow-up.    No right-sided ovarian torsion.    IUD in place.    Nonvisualization of the left ovary.      Electronically signed by: Burton Jeffries MD  Date:    07/28/2023  Time:    00:09               Narrative:    EXAMINATION:  US PELVIS COMP WITH TRANSVAG NON-OB (XPD)    CLINICAL HISTORY:  pain, ct stated hydrosalpinx;    COMPARISON:  CT, 07/27/2023.    TECHNIQUE:  Transabdominal and transvaginal pelvic ultrasound were performed utilizing grayscale ultrasound and color Doppler.    FINDINGS:  Uterus measures 7.1 x 4.1 x 4.9 cm.  No focal abnormality.  The endometrial stripe is not thickened measuring 0.2 cm.  Intrauterine device is present.    Left ovary not visualized despite multiple attempts.  Right ovary measures approximately 2.2 x 1.8 x 2.2 cm.  There is probable adjacent  "hydrosalpinx which measures approximately 1.1 cm in maximum diameter.  There is possible mild wall thickening of the fallopian tube with minimal low level internal echoes.  No intralesional vascularity identified within the presumed right hydrosalpinx.  Normal arterial and venous waveforms in the right ovary.    There is trace free fluid within the pelvis.                                       X-Ray Chest AP Portable (Final result)  Result time 07/27/23 22:26:56      Final result by Burton Jeffries MD (07/27/23 22:26:56)                   Impression:      No acute cardiopulmonary finding identified on this single view.      Electronically signed by: Burton Jeffries MD  Date:    07/27/2023  Time:    22:26               Narrative:    EXAMINATION:  XR CHEST AP PORTABLE    CLINICAL HISTORY:  Provided history is "Sepsis;  ".    TECHNIQUE:  One view of the chest.    COMPARISON:  04/07/2021.    FINDINGS:  Cardiac wires overlie the chest.  Cardiomediastinal silhouette is not enlarged.  Lung volumes are low, accentuating basilar markings.  No confluent area of consolidation.  No sizable pleural effusion.  No pneumothorax.                                        CT Abdomen Pelvis With Contrast (Final result)  Result time 07/27/23 22:31:01      Final result by Mumtaz Hernández MD (07/27/23 22:31:01)                   Impression:      1. Tubular fluid-filled structure in the right adnexa with mild wall thickening and enhancement suggesting hydrosalpinx with possible associated inflammatory/infectious process.  No mass is identified.  Follow-up recommended.  2. There is mild wall thickening and enhancement of adjacent distal small bowel which could represent primary or secondary ileitis.  Follow-up recommended.  3. Intrauterine device present.  4. See above comments also.  5.  This report was flagged in Epic as abnormal.      Electronically signed by: Mumtaz Hernández  Date:    07/27/2023  Time:    22:31               Narrative: "    EXAMINATION:  CT ABDOMEN PELVIS WITH CONTRAST    CLINICAL HISTORY:  Abdominal pain, acute, nonlocalized;    TECHNIQUE:  Low dose axial images, sagittal and coronal reformations were obtained from the lung bases to the pubic symphysis following the IV administration of 100 mL of Omnipaque 350 .  Oral contrast was not administered.    COMPARISON:  None.    FINDINGS:  Abdomen:    - Lower thorax:    - Lung bases: No infiltrates and no nodules.    - Liver: No focal mass.    - Gallbladder: No calcified gallstones.    - Bile Ducts: No evidence of intra or extra hepatic biliary ductal dilation.    - Spleen: Negative.    - Kidneys: No mass or hydronephrosis.    - Adrenals: Unremarkable.    - Pancreas: No mass or peripancreatic fat stranding.    - Retroperitoneum:  No significant adenopathy.    - Vascular: No abdominal aortic aneurysm.    - Abdominal wall:  Unremarkable.    Pelvis:    The urinary bladder is within normal limits.    The uterus is midline.  Intrauterine device is noted.    Tubular serpiginous fluid-filled structure in the right adnexa with mild wall thickening and enhancement suggesting hydrosalpinx.  Mild stranding.    No mass or abscess is detected.    There is mild wall thickening and enhancement of adjacent distal small bowel also.    Bowel/Mesentery:    No evidence of bowel obstruction.    The appendix is within normal limits.  The distal margin extends to the right adnexal process though not definitely involved    Bones:  No acute osseous abnormality and no suspicious lytic or blastic lesion.                                       Medications   ibuprofen tablet 600 mg (600 mg Oral Given 7/27/23 1833)   acetaminophen tablet 1,000 mg (1,000 mg Oral Given 7/27/23 1833)   sodium chloride 0.9% bolus 1,000 mL 1,000 mL (0 mLs Intravenous Stopped 7/27/23 2221)   iohexoL (OMNIPAQUE 350) injection 100 mL (100 mLs Intravenous Given 7/27/23 2137)   cefTRIAXone (ROCEPHIN) 2 g in dextrose 5 % in water (D5W) 100 mL  "IVPB (MB+) (0 g Intravenous Stopped 7/28/23 0005)   morphine injection 4 mg (4 mg Intravenous Given 7/27/23 2348)   ondansetron injection 4 mg (4 mg Intravenous Given 7/27/23 2349)   doxycycline tablet 100 mg (100 mg Oral Given 7/28/23 0045)     Medical Decision Making:   History:   Old Medical Records: I decided to obtain old medical records.  Old Records Summarized: records from clinic visits.  Initial Assessment:   52 y.o. female with fever, chills, sweats, runny nose, and abdominal pain.  Differential Diagnosis:   Ddx includes viral URI such as flu or COVID-19, UTI, pyelonephritis, intra-abdominal abscess, other.  Independently Interpreted Test(s):   I have ordered and independently interpreted X-rays - see prior notes.  I have ordered and independently interpreted EKG Reading(s) - see prior notes  Clinical Tests:   Lab Tests: Ordered and Reviewed  Radiological Study: Reviewed and Ordered  Medical Tests: Ordered and Reviewed  ED Management:  Patient received ibuprofen and APAP via triage with improvement in fever.    POC CBC: WBC 11.9.  Hgb 13.3.  Platelets 200.      POC CMP reassuring. Minimal elevation of AST and ALT of unclear significance. UA 3+ leukocytes, no nitrites. Lactate normal x 2.      COVID-19 positive.      CT abdom/pelvis with contrast:  "1. Tubular fluid-filled structure in the right adnexa with mild wall thickening and enhancement suggesting hydrosalpinx with possible associated inflammatory/infectious process.  No mass is identified.  2. There is mild wall thickening and enhancement of adjacent distal small bowel which could represent primary or secondary ileitis."    I then checked ultrasound of pelvis: "Findings suggestive of right-sided hydrosalpinx versus pyosalpinx."    I discussed hydrosalpinx vs pyosalpinx with OB/GYN on call, Dr. Karen Correia.  Since the hydrosalpinx measures 1.1cm in diameter, Dr. Correia thinks it is not the main cause of the patient's pain.  She " recommended treatment with Doxycycline 100mg PO BID x 14 days and f/u to OB/GYN.  I started Doxycycline in ER.    I also treated the patient with IV rocephin 2g in ER and have rx'ed her Keflex 500mg PO BID x 10 days for leukocytes in urine / questionable UTI.      I suspect the ileitis is the primary cause of patient's abdominal discomfort, and that this is related to her COVID-19.  I treated her with IV NS 1L, IV zofran 4mg, and IV morphine 4mg, in addition to APAP and ibuprofen.  Patient had improvement in pain with this.  She also received the abx discussed.    I discussed findings of workup with patient and son via .  I discussed need to take abx and also need to f/u to OB/GYN.    I messaged Dr. Fernandez and Dr. Ramos in Epic regarding this visit.    Other:   I have discussed this case with another health care provider.                        Clinical Impression:   Final diagnoses:  [R50.9] Fever  [U07.1] COVID-19 (Primary)  [K52.9] Ileitis  [R82.998] Leukocytes in urine  [N30.01] Acute cystitis with hematuria  [N70.11] Hydrosalpinx  [R10.9] Abdominal pain, unspecified abdominal location  [R11.0] Nausea  [R63.0] Decreased appetite        ED Disposition Condition    Discharge Stable          ED Prescriptions       Medication Sig Dispense Start Date End Date Auth. Provider    doxycycline (VIBRAMYCIN) 100 MG Cap Take 1 capsule (100 mg total) by mouth 2 (two) times daily. for 14 days 28 capsule 7/28/2023 8/11/2023 Edilma Russo MD    cephALEXin (KEFLEX) 500 MG capsule Take 1 capsule (500 mg total) by mouth every 8 (eight) hours. for 10 days 30 capsule 7/28/2023 8/7/2023 Edilma Russo MD    esomeprazole (NEXIUM) 40 MG capsule Take 1 capsule (40 mg total) by mouth before breakfast. Take on an empty stomach first thing in the morning.  You may drink water with this medication but do not eat or drink anything else with it. 30 capsule 7/28/2023 7/27/2024 Edilma Russo MD    ibuprofen (ADVIL,MOTRIN)  600 MG tablet Take 1 tablet (600 mg total) by mouth every 6 (six) hours as needed for Pain. 30 tablet 7/28/2023 -- Edilma Russo MD    ondansetron (ZOFRAN-ODT) 4 MG TbDL Take 1 tablet (4 mg total) by mouth every 4 (four) hours as needed (nausea/vomiting). 20 tablet 7/28/2023 -- Edilma Russo MD    senna-docusate 8.6-50 mg (PERICOLACE) 8.6-50 mg per tablet Take 2 tablets by mouth daily as needed for Constipation. 30 tablet 7/28/2023 -- Edilma Russo MD          Follow-up Information       Follow up With Specialties Details Why Contact Info    Anthony Fernandez MD Obstetrics and Gynecology, Obstetrics and Gynecology Schedule an appointment as soon as possible for a visit  for hydrosalpinx [address]  [address]  [facility] [address]  [phone]               Edilma Russo MD  07/28/23 1142

## 2023-07-29 LAB — BACTERIA UR CULT: NORMAL

## 2023-07-30 ENCOUNTER — HOSPITAL ENCOUNTER (EMERGENCY)
Facility: HOSPITAL | Age: 53
Discharge: HOME | End: 2023-07-31
Attending: INTERNAL MEDICINE
Payer: COMMERCIAL

## 2023-07-30 DIAGNOSIS — R10.9 ABDOMINAL PAIN, UNSPECIFIED ABDOMINAL LOCATION: ICD-10-CM

## 2023-07-30 DIAGNOSIS — R19.8 RIGHT PELVIC ADNEXAL FLUID COLLECTION: Primary | ICD-10-CM

## 2023-07-30 LAB
ALBUMIN SERPL BCP-MCNC: 3.1 G/DL (ref 3.5–5.2)
ALP SERPL-CCNC: 267 U/L (ref 55–135)
ALT SERPL W/O P-5'-P-CCNC: 217 U/L (ref 10–44)
ANION GAP SERPL CALC-SCNC: 11 MMOL/L (ref 8–16)
AST SERPL-CCNC: 193 U/L (ref 10–40)
BASOPHILS # BLD AUTO: 0.03 K/UL (ref 0–0.2)
BASOPHILS NFR BLD: 0.2 % (ref 0–1.9)
BILIRUB SERPL-MCNC: 0.4 MG/DL (ref 0.1–1)
BILIRUB UR QL STRIP: NEGATIVE
BUN SERPL-MCNC: 13 MG/DL (ref 6–20)
CALCIUM SERPL-MCNC: 9.7 MG/DL (ref 8.7–10.5)
CHLORIDE SERPL-SCNC: 104 MMOL/L (ref 95–110)
CLARITY UR: CLEAR
CO2 SERPL-SCNC: 23 MMOL/L (ref 23–29)
COLOR UR: COLORLESS
CREAT SERPL-MCNC: 0.8 MG/DL (ref 0.5–1.4)
DIFFERENTIAL METHOD: ABNORMAL
EOSINOPHIL # BLD AUTO: 0.1 K/UL (ref 0–0.5)
EOSINOPHIL NFR BLD: 0.7 % (ref 0–8)
ERYTHROCYTE [DISTWIDTH] IN BLOOD BY AUTOMATED COUNT: 11.9 % (ref 11.5–14.5)
EST. GFR  (NO RACE VARIABLE): >60 ML/MIN/1.73 M^2
GLUCOSE SERPL-MCNC: 119 MG/DL (ref 70–110)
GLUCOSE UR QL STRIP: NEGATIVE
HCT VFR BLD AUTO: 35.2 % (ref 37–48.5)
HGB BLD-MCNC: 11.6 G/DL (ref 12–16)
HGB UR QL STRIP: NEGATIVE
IMM GRANULOCYTES # BLD AUTO: 0.05 K/UL (ref 0–0.04)
IMM GRANULOCYTES NFR BLD AUTO: 0.4 % (ref 0–0.5)
KETONES UR QL STRIP: NEGATIVE
LACTATE SERPL-SCNC: 0.8 MMOL/L (ref 0.5–2.2)
LEUKOCYTE ESTERASE UR QL STRIP: ABNORMAL
LYMPHOCYTES # BLD AUTO: 3.3 K/UL (ref 1–4.8)
LYMPHOCYTES NFR BLD: 25.2 % (ref 18–48)
MCH RBC QN AUTO: 31.4 PG (ref 27–31)
MCHC RBC AUTO-ENTMCNC: 33 G/DL (ref 32–36)
MCV RBC AUTO: 95 FL (ref 82–98)
MICROSCOPIC COMMENT: NORMAL
MONOCYTES # BLD AUTO: 0.7 K/UL (ref 0.3–1)
MONOCYTES NFR BLD: 5.1 % (ref 4–15)
NEUTROPHILS # BLD AUTO: 9.1 K/UL (ref 1.8–7.7)
NEUTROPHILS NFR BLD: 68.4 % (ref 38–73)
NITRITE UR QL STRIP: NEGATIVE
NRBC BLD-RTO: 0 /100 WBC
PH UR STRIP: 7 [PH] (ref 5–8)
PLATELET # BLD AUTO: 283 K/UL (ref 150–450)
PMV BLD AUTO: 9.2 FL (ref 9.2–12.9)
POTASSIUM SERPL-SCNC: 3.8 MMOL/L (ref 3.5–5.1)
PROT SERPL-MCNC: 7.7 G/DL (ref 6–8.4)
PROT UR QL STRIP: NEGATIVE
RBC # BLD AUTO: 3.69 M/UL (ref 4–5.4)
SODIUM SERPL-SCNC: 138 MMOL/L (ref 136–145)
SP GR UR STRIP: 1 (ref 1–1.03)
URN SPEC COLLECT METH UR: ABNORMAL
UROBILINOGEN UR STRIP-ACNC: NEGATIVE EU/DL
WBC # BLD AUTO: 13.26 K/UL (ref 3.9–12.7)
WBC #/AREA URNS HPF: 2 /HPF (ref 0–5)

## 2023-07-30 PROCEDURE — 80053 COMPREHEN METABOLIC PANEL: CPT | Performed by: INTERNAL MEDICINE

## 2023-07-30 PROCEDURE — 83605 ASSAY OF LACTIC ACID: CPT | Performed by: INTERNAL MEDICINE

## 2023-07-30 PROCEDURE — 99285 EMERGENCY DEPT VISIT HI MDM: CPT | Mod: 25

## 2023-07-30 PROCEDURE — 85025 COMPLETE CBC W/AUTO DIFF WBC: CPT | Performed by: INTERNAL MEDICINE

## 2023-07-30 PROCEDURE — 81000 URINALYSIS NONAUTO W/SCOPE: CPT | Performed by: INTERNAL MEDICINE

## 2023-07-30 PROCEDURE — 87040 BLOOD CULTURE FOR BACTERIA: CPT | Performed by: INTERNAL MEDICINE

## 2023-07-31 VITALS
OXYGEN SATURATION: 99 % | BODY MASS INDEX: 24.99 KG/M2 | WEIGHT: 150 LBS | RESPIRATION RATE: 14 BRPM | DIASTOLIC BLOOD PRESSURE: 56 MMHG | SYSTOLIC BLOOD PRESSURE: 117 MMHG | HEART RATE: 58 BPM | TEMPERATURE: 98 F | HEIGHT: 65 IN

## 2023-07-31 PROBLEM — R19.8 RIGHT PELVIC ADNEXAL FLUID COLLECTION: Status: ACTIVE | Noted: 2023-07-31

## 2023-07-31 PROCEDURE — 96365 THER/PROPH/DIAG IV INF INIT: CPT

## 2023-07-31 PROCEDURE — 63600175 PHARM REV CODE 636 W HCPCS: Performed by: INTERNAL MEDICINE

## 2023-07-31 PROCEDURE — 96375 TX/PRO/DX INJ NEW DRUG ADDON: CPT

## 2023-07-31 PROCEDURE — 25000003 PHARM REV CODE 250: Performed by: INTERNAL MEDICINE

## 2023-07-31 RX ORDER — KETOROLAC TROMETHAMINE 30 MG/ML
30 INJECTION, SOLUTION INTRAMUSCULAR; INTRAVENOUS
Status: COMPLETED | OUTPATIENT
Start: 2023-07-31 | End: 2023-07-31

## 2023-07-31 RX ORDER — IBUPROFEN 600 MG/1
600 TABLET ORAL EVERY 8 HOURS PRN
Qty: 30 TABLET | Refills: 0 | Status: SHIPPED | OUTPATIENT
Start: 2023-07-31

## 2023-07-31 RX ADMIN — PIPERACILLIN AND TAZOBACTAM 4.5 G: 4; .5 INJECTION, POWDER, LYOPHILIZED, FOR SOLUTION INTRAVENOUS; PARENTERAL at 01:07

## 2023-07-31 RX ADMIN — KETOROLAC TROMETHAMINE 30 MG: 30 INJECTION, SOLUTION INTRAMUSCULAR; INTRAVENOUS at 01:07

## 2023-07-31 NOTE — ED TRIAGE NOTES
Pt reports epigastric pain x 3 days. Pt reports going to the ER 3 days ago for the same complaints and being diagnosed with Co-vid 19. Pt also reports diarrhea today and declines nausea at this time. Pt denies chest pain, cough, dizziness, nausea or SOB.

## 2023-07-31 NOTE — DISCHARGE INSTRUCTIONS
Follow-up with your primary care physician within the next 3 days for re-evaluation.  Continue doxycycline as previously prescribed.

## 2023-07-31 NOTE — ED PROVIDER NOTES
Encounter Date: 7/30/2023    SCRIBE #1 NOTE: I, Howie Herrera, am scribing for, and in the presence of,  Walter Hutton MD. I have scribed the following portions of the note - Other sections scribed: HPI, ROS, PE.       History     Chief Complaint   Patient presents with    Abdominal Pain     Abdominal pain since Thursday, has not taken anything for s/s, denying -n/v +diarrhea. Reports pain is intermittent. Recent dx of COVID, seen and treated as well for cystitis.     Jacque Sanford is a 52 y.o. female with no pertinent PMHx, who presents to the ED for evaluation of lower abdominal pain beginning 2 days ago. History provided by independent historian, patient's son, who reports patient was initially seen in the ED 2 days ago and was diagnosed with Covid-19. He further endorses her complaints have not subsided since onset despite taking medications. He notes she had one vomiting episode after her visit, which has resolved, but notes associated diarrhea. No medications taken PTA. No alleviating or exacerbating factors noted. Denies nausea, vomiting, or other associated symptoms. NKDA.     The history is provided by the patient and a relative. The history is limited by a language barrier. A  was used (patient's family).     Review of patient's allergies indicates:  No Known Allergies  No past medical history on file.  No past surgical history on file.  Family History   Problem Relation Age of Onset    Diabetes Father     Hypertension Father      Social History     Tobacco Use    Smoking status: Never    Smokeless tobacco: Never   Substance Use Topics    Alcohol use: No    Drug use: No     Review of Systems   Constitutional:  Negative for fever.   HENT:  Negative for sore throat.    Respiratory:  Negative for shortness of breath.    Cardiovascular:  Negative for chest pain.   Gastrointestinal:  Positive for abdominal pain and diarrhea. Negative for nausea and vomiting.   Genitourinary:  Negative for  dysuria.   Musculoskeletal:  Negative for back pain.   Skin:  Negative for rash.   Neurological:  Negative for weakness and headaches.   Psychiatric/Behavioral:  Negative for behavioral problems.    All other systems reviewed and are negative.      Physical Exam     Initial Vitals [07/30/23 2120]   BP Pulse Resp Temp SpO2   120/63 73 20 98.4 °F (36.9 °C) 98 %      MAP       --         Physical Exam    Nursing note and vitals reviewed.  Constitutional: She appears well-developed and well-nourished.   HENT:   Head: Normocephalic and atraumatic.   Right Ear: External ear normal.   Left Ear: External ear normal.   Mouth/Throat: Oropharynx is clear and moist.   Eyes: Conjunctivae are normal.   Neck: Neck supple.   Normal range of motion.  Cardiovascular:  Normal rate, regular rhythm and normal heart sounds.     Exam reveals no gallop and no friction rub.       No murmur heard.  Pulmonary/Chest: Breath sounds normal. No respiratory distress. She has no wheezes. She has no rhonchi. She has no rales.   Abdominal: Abdomen is soft.   Lower abdominal tenderness with no peritoneal signs.  No peritoneal tenderness to palpation at this time.   Musculoskeletal:         General: No edema. Normal range of motion.      Cervical back: Normal range of motion and neck supple.     Neurological: She is alert and oriented to person, place, and time. GCS score is 15. GCS eye subscore is 4. GCS verbal subscore is 5. GCS motor subscore is 6.   Skin: Skin is warm and dry. Capillary refill takes less than 2 seconds.   Psychiatric: She has a normal mood and affect.         ED Course   Procedures  Labs Reviewed   CBC W/ AUTO DIFFERENTIAL - Abnormal; Notable for the following components:       Result Value    WBC 13.26 (*)     RBC 3.69 (*)     Hemoglobin 11.6 (*)     Hematocrit 35.2 (*)     MCH 31.4 (*)     Gran # (ANC) 9.1 (*)     Immature Grans (Abs) 0.05 (*)     All other components within normal limits   COMPREHENSIVE METABOLIC PANEL -  Abnormal; Notable for the following components:    Glucose 119 (*)     Albumin 3.1 (*)     Alkaline Phosphatase 267 (*)      (*)      (*)     All other components within normal limits   URINALYSIS, REFLEX TO URINE CULTURE - Abnormal; Notable for the following components:    Color, UA Colorless (*)     Leukocytes, UA Trace (*)     All other components within normal limits    Narrative:     Specimen Source->Urine   CULTURE, BLOOD   CULTURE, BLOOD   LACTIC ACID, PLASMA   URINALYSIS MICROSCOPIC    Narrative:     Specimen Source->Urine          Imaging Results              US Pelvis Comp with Transvag NON-OB (xpd (Final result)  Result time 07/31/23 00:24:57      Final result by Sanaz Vines MD (07/31/23 00:24:57)                   Impression:      Suboptimal imaging.  Redemonstration of complex right adnexal structure presumably relating to findings seen on recent CT and ultrasound.  No evidence of ovarian torsion.  Future follow-up and imaging may be obtained as clinically indicated.  Ob gyn follow-up is recommended if not already obtained.      Electronically signed by: Sanaz Vines MD  Date:    07/31/2023  Time:    00:24               Narrative:    EXAMINATION:  US PELVIS COMP WITH TRANSVAG NON-OB (XPD)    CLINICAL HISTORY:  Lower abdominal pain, rule out Right pyosalpinx;    TECHNIQUE:  Transabdominal sonography of the pelvis was performed, followed by transvaginal sonography to better evaluate the uterus and ovaries.    COMPARISON:  Recent pelvic ultrasound 07/27/2023.    FINDINGS:  The uterus measures 7 x 4 x 6 cm. Uterine parenchyma is homogeneous without evidence for masses.  IUD is visualized.  No evidence of endometrial thickening.    The right ovary measures 4 x 3 x 3 cm.  Suspected left ovary measures 1 x 1 x 1 cm. Arterial and venous flow are preserved bilaterally.  Complex right adnexal structure is seen measuring 7 x 6 x 5 cm in maximum dimensions.  This presumably relates to findings  seen on recent pelvic ultrasound and CT.  Small volume pelvic free fluid is seen.                                       Medications   ketorolac injection 30 mg (30 mg Intravenous Given 7/31/23 0100)     Medical Decision Making:   History:   I obtained history from: someone other than patient.       <> Summary of History: History provided by independent historian, patient's family.  Old Medical Records: I decided to obtain old medical records.  Old Records Summarized: other records.       <> Summary of Records: External documents reviewed.  Initial Assessment:   Jacque Sanford is a 52 y.o. female with no pertinent PMHx, who presents to the ED for evaluation of lower abdominal pain beginning 2 days ago. History provided by independent historian, patient's son, who reports patient was initially seen in the ED 2 days ago and was diagnosed with Covid-19. He further endorses her complaints have not subsided since onset despite taking medications. He notes she had one vomiting episode after her visit, which has resolved, but notes associated diarrhea. No medications taken PTA. No alleviating or exacerbating factors noted. Denies nausea, vomiting, or other associated symptoms. NKDA.   Clinical Tests:   Lab Tests: Ordered and Reviewed  ED Management:  CBC shows elevated white blood cell count, stable from previous CBC 2 days ago (13.2).  Patient has been afebrile.  Ultrasound of pelvis shows right adnexal fluid collection.  When compared to previous, OBGYN on-call states patient is to continue doxycycline and follow-up with PCP or OBGYN within the next week for re-evaluation/return to the emergency department if condition worsens.  Instructions for abdominal/pelvic pain were given prior to discharge.          Scribe Attestation:   Scribe #1: I performed the above scribed service and the documentation accurately describes the services I performed. I attest to the accuracy of the note.                     Clinical Impression:    Final diagnoses:  [R10.9] Abdominal pain, unspecified abdominal location  [R19.8] Right pelvic adnexal fluid collection (Primary)        ED Disposition Condition    Discharge Stable          ED Prescriptions       Medication Sig Dispense Start Date End Date Auth. Provider    ibuprofen (ADVIL,MOTRIN) 600 MG tablet Take 1 tablet (600 mg total) by mouth every 8 (eight) hours as needed for Pain. 30 tablet 7/31/2023 -- Walter Hutton MD          Follow-up Information    None         This document was produced by a scribe under my direction and in my presence. I agree with the content of the note and have made any necessary edits.     Dr. Hutton    07/31/2023 6:58 AM       Walter Hutton MD  07/31/23 0615

## 2023-08-01 LAB
BACTERIA BLD CULT: NORMAL
BACTERIA BLD CULT: NORMAL
BACTERIAL VAGINOSIS DNA: POSITIVE
C TRACH DNA SPEC QL NAA+PROBE: NOT DETECTED
CANDIDA GLABRATA DNA: NEGATIVE
CANDIDA KRUSEI DNA: NEGATIVE
CANDIDA RRNA VAG QL PROBE: NEGATIVE
N GONORRHOEA DNA SPEC QL NAA+PROBE: NOT DETECTED
T VAGINALIS RRNA GENITAL QL PROBE: NEGATIVE

## 2023-08-02 ENCOUNTER — TELEPHONE (OUTPATIENT)
Dept: EMERGENCY MEDICINE | Facility: HOSPITAL | Age: 53
End: 2023-08-02
Payer: COMMERCIAL

## 2023-08-02 RX ORDER — METRONIDAZOLE 500 MG/1
500 TABLET ORAL EVERY 12 HOURS
Qty: 14 TABLET | Refills: 0 | Status: SHIPPED | OUTPATIENT
Start: 2023-08-02 | End: 2023-08-09

## 2023-08-03 LAB
BACTERIA BLD CULT: NORMAL
BACTERIA BLD CULT: NORMAL

## 2023-08-29 ENCOUNTER — OFFICE VISIT (OUTPATIENT)
Dept: OBSTETRICS AND GYNECOLOGY | Facility: CLINIC | Age: 53
End: 2023-08-29
Payer: COMMERCIAL

## 2023-08-29 ENCOUNTER — LAB VISIT (OUTPATIENT)
Dept: LAB | Facility: HOSPITAL | Age: 53
End: 2023-08-29
Attending: OBSTETRICS & GYNECOLOGY
Payer: COMMERCIAL

## 2023-08-29 VITALS
SYSTOLIC BLOOD PRESSURE: 138 MMHG | WEIGHT: 154.31 LBS | BODY MASS INDEX: 25.71 KG/M2 | HEIGHT: 65 IN | DIASTOLIC BLOOD PRESSURE: 70 MMHG

## 2023-08-29 DIAGNOSIS — Z30.431 IUD CHECK UP: ICD-10-CM

## 2023-08-29 DIAGNOSIS — Z01.419 WELL WOMAN EXAM WITH ROUTINE GYNECOLOGICAL EXAM: Primary | ICD-10-CM

## 2023-08-29 DIAGNOSIS — N95.1 HOT FLASH, MENOPAUSAL: ICD-10-CM

## 2023-08-29 DIAGNOSIS — Z12.31 SCREENING MAMMOGRAM, ENCOUNTER FOR: ICD-10-CM

## 2023-08-29 LAB
ESTRADIOL SERPL-MCNC: <10 PG/ML
FSH SERPL-ACNC: 92.42 MIU/ML
LH SERPL-ACNC: 24.9 MIU/ML
PROLACTIN SERPL IA-MCNC: 10.4 NG/ML (ref 5.2–26.5)
TSH SERPL DL<=0.005 MIU/L-ACNC: 2.07 UIU/ML (ref 0.4–4)

## 2023-08-29 PROCEDURE — 3075F SYST BP GE 130 - 139MM HG: CPT | Mod: CPTII,S$GLB,, | Performed by: OBSTETRICS & GYNECOLOGY

## 2023-08-29 PROCEDURE — 1160F PR REVIEW ALL MEDS BY PRESCRIBER/CLIN PHARMACIST DOCUMENTED: ICD-10-PCS | Mod: CPTII,S$GLB,, | Performed by: OBSTETRICS & GYNECOLOGY

## 2023-08-29 PROCEDURE — 99396 PREV VISIT EST AGE 40-64: CPT | Mod: S$GLB,,, | Performed by: OBSTETRICS & GYNECOLOGY

## 2023-08-29 PROCEDURE — 4010F ACE/ARB THERAPY RXD/TAKEN: CPT | Mod: CPTII,S$GLB,, | Performed by: OBSTETRICS & GYNECOLOGY

## 2023-08-29 PROCEDURE — 99999 PR PBB SHADOW E&M-EST. PATIENT-LVL III: CPT | Mod: PBBFAC,,, | Performed by: OBSTETRICS & GYNECOLOGY

## 2023-08-29 PROCEDURE — 3078F PR MOST RECENT DIASTOLIC BLOOD PRESSURE < 80 MM HG: ICD-10-PCS | Mod: CPTII,S$GLB,, | Performed by: OBSTETRICS & GYNECOLOGY

## 2023-08-29 PROCEDURE — 99396 PR PREVENTIVE VISIT,EST,40-64: ICD-10-PCS | Mod: S$GLB,,, | Performed by: OBSTETRICS & GYNECOLOGY

## 2023-08-29 PROCEDURE — 36415 COLL VENOUS BLD VENIPUNCTURE: CPT | Performed by: OBSTETRICS & GYNECOLOGY

## 2023-08-29 PROCEDURE — 3075F PR MOST RECENT SYSTOLIC BLOOD PRESS GE 130-139MM HG: ICD-10-PCS | Mod: CPTII,S$GLB,, | Performed by: OBSTETRICS & GYNECOLOGY

## 2023-08-29 PROCEDURE — 84443 ASSAY THYROID STIM HORMONE: CPT | Performed by: OBSTETRICS & GYNECOLOGY

## 2023-08-29 PROCEDURE — 4010F PR ACE/ARB THEARPY RXD/TAKEN: ICD-10-PCS | Mod: CPTII,S$GLB,, | Performed by: OBSTETRICS & GYNECOLOGY

## 2023-08-29 PROCEDURE — 99999 PR PBB SHADOW E&M-EST. PATIENT-LVL III: ICD-10-PCS | Mod: PBBFAC,,, | Performed by: OBSTETRICS & GYNECOLOGY

## 2023-08-29 PROCEDURE — 3008F BODY MASS INDEX DOCD: CPT | Mod: CPTII,S$GLB,, | Performed by: OBSTETRICS & GYNECOLOGY

## 2023-08-29 PROCEDURE — 83002 ASSAY OF GONADOTROPIN (LH): CPT | Performed by: OBSTETRICS & GYNECOLOGY

## 2023-08-29 PROCEDURE — 3008F PR BODY MASS INDEX (BMI) DOCUMENTED: ICD-10-PCS | Mod: CPTII,S$GLB,, | Performed by: OBSTETRICS & GYNECOLOGY

## 2023-08-29 PROCEDURE — 83001 ASSAY OF GONADOTROPIN (FSH): CPT | Performed by: OBSTETRICS & GYNECOLOGY

## 2023-08-29 PROCEDURE — 84146 ASSAY OF PROLACTIN: CPT | Performed by: OBSTETRICS & GYNECOLOGY

## 2023-08-29 PROCEDURE — 3078F DIAST BP <80 MM HG: CPT | Mod: CPTII,S$GLB,, | Performed by: OBSTETRICS & GYNECOLOGY

## 2023-08-29 PROCEDURE — 1159F PR MEDICATION LIST DOCUMENTED IN MEDICAL RECORD: ICD-10-PCS | Mod: CPTII,S$GLB,, | Performed by: OBSTETRICS & GYNECOLOGY

## 2023-08-29 PROCEDURE — 82670 ASSAY OF TOTAL ESTRADIOL: CPT | Performed by: OBSTETRICS & GYNECOLOGY

## 2023-08-29 PROCEDURE — 1159F MED LIST DOCD IN RCRD: CPT | Mod: CPTII,S$GLB,, | Performed by: OBSTETRICS & GYNECOLOGY

## 2023-08-29 PROCEDURE — 1160F RVW MEDS BY RX/DR IN RCRD: CPT | Mod: CPTII,S$GLB,, | Performed by: OBSTETRICS & GYNECOLOGY

## 2023-08-29 RX ORDER — LOSARTAN POTASSIUM 25 MG/1
25 TABLET ORAL
COMMUNITY
Start: 2023-06-06

## 2023-08-29 NOTE — PROGRESS NOTES
Subjective:       Patient ID: Jacque Sanford is a 52 y.o. female.    Chief Complaint:  Well Woman (Last normal pap with Negative HPV and last normal mammogram were on 2021. Pt with IUD from [place])      History of Present Illness  HPI  Annual Exam-Premenopausal  Patient presents for annual exam. The patient is sexually active. GYN screening history: last pap: approximate date 2021 and was normal and last mammogram: approximate date 2021 and was normal. The patient wears seatbelts: yes. The patient participates in regular exercise: yes. Has the patient ever been transfused or tattooed?: no. The patient reports that there is not domestic violence in her life.    Now with hot flashes, vaginal dryness with dyspareunia and irritability.  Decreased libido.    IUD from [place] since .  No complaint  Colonoscopy in .        GYN & OB History  No LMP recorded. Patient has had an implant.   Date of Last Pap: 2021    OB History    Para Term  AB Living   4 2 2   2     SAB IAB Ectopic Multiple Live Births   2              # Outcome Date GA Lbr Daryl/2nd Weight Sex Delivery Anes PTL Lv   4 Term            3 Term            2 SAB            1 SAB              History reviewed. No pertinent past medical history.    History reviewed. No pertinent surgical history.    Family History   Problem Relation Age of Onset    Diabetes Father     Hypertension Father        Social History     Socioeconomic History    Marital status:    Tobacco Use    Smoking status: Never    Smokeless tobacco: Never   Substance and Sexual Activity    Alcohol use: No    Drug use: No    Sexual activity: Yes     Partners: Male     Birth control/protection: I.U.D.   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      esomeprazole (NEXIUM) 40 MG capsule Take 1 capsule (40 mg total) by mouth before breakfast. Take on an empty stomach first thing in the morning.  You may drink water with this medication but do not eat or drink anything else with it. 30 capsule 1    ibuprofen (ADVIL,MOTRIN) 600 MG tablet Take 1 tablet (600 mg total) by mouth every 8 (eight) hours as needed for Pain. 30 tablet 0    INTRAUTERINE DEVICE, IUD, IU by Intrauterine route.      losartan (COZAAR) 25 MG tablet Take 25 mg by mouth.      meloxicam (MOBIC) 15 MG tablet Take 1 tablet (15 mg total) by mouth once daily. 30 tablet 0    ondansetron (ZOFRAN-ODT) 4 MG TbDL Take 1 tablet (4 mg total) by mouth every 4 (four) hours as needed (nausea/vomiting). 20 tablet 1    senna-docusate 8.6-50 mg (PERICOLACE) 8.6-50 mg per tablet Take 2 tablets by mouth daily as needed for Constipation. 30 tablet 1     No current facility-administered medications for this visit.       Review of patient's allergies indicates:  No Known Allergies    Review of Systems  Review of Systems   Constitutional:  Negative for activity change, appetite change, chills, fatigue, fever and unexpected weight change.   HENT:  Negative for mouth sores.    Respiratory:  Negative for cough and shortness of breath.    Cardiovascular:  Negative for chest pain and palpitations.   Gastrointestinal:  Negative for abdominal pain, bloating, blood in stool, constipation, nausea and vomiting.   Endocrine: Positive for hot flashes. Negative for diabetes.   Genitourinary:  Positive for decreased libido, dyspareunia, hot flashes and vaginal dryness. Negative for dysmenorrhea, dysuria, frequency, hematuria, menorrhagia, menstrual problem, pelvic pain, urgency, vaginal bleeding, vaginal discharge, vaginal pain, urinary incontinence, postcoital bleeding and vaginal odor.   Musculoskeletal:  Negative for back pain and myalgias.   Integumentary:  Negative for rash, breast mass and nipple  discharge.   Neurological:  Negative for seizures and headaches.   Psychiatric/Behavioral:  Negative for depression and sleep disturbance. The patient is not nervous/anxious.    Breast: Negative for mass, mastodynia and nipple discharge          Objective:    Physical Exam:   Constitutional: She appears well-developed and well-nourished. No distress.   BMI of 25.68    HENT:   Head: Normocephalic and atraumatic.    Eyes: EOM are normal.      Pulmonary/Chest: Effort normal. No respiratory distress.   Breasts: Non-tender, no engorgement, no masses, no retraction, no discharge. Negative for lymphadenopathy.         Abdominal: Soft. She exhibits no distension. There is no abdominal tenderness. There is no rebound and no guarding.     Genitourinary:    Vagina normal.   No  no vaginal discharge in the vagina.    Genitourinary Comments: Vulva without any obvious lesions.  Urethral meatus normal size and location without any lesion.  Urethra is non-tender without stricture or discharge.  Bladder is non-tender.  Vaginal vault with good support.  Minimal white discharge noted.  No obvious lesion.  Normal rugation.  Cervix is without any cervical motion tenderness.  No obvious lesion.  IUD strings visible.  Uterus is about 10 weeks, non-tender, normal contour.  Adnexa is without any masses or tenderness.  Perineum without obvious lesion.               Musculoskeletal: Normal range of motion.       Neurological: She is alert.    Skin: Skin is warm and dry.    Psychiatric: She has a normal mood and affect.          Assessment:        1. Well woman exam with routine gynecological exam    2. Screening mammogram, encounter for    3. Hot flash, menopausal    4. IUD check up            Plan:          I have discussed with the patient her condition.  Monthly breast examination was instructed, discussed, and encouraged.  Patient was encouraged to consume a low-calorie, low fat diet, and to increase of physical activity.  Healthy habits  encouraged.  A Pap smear was not performed with HR-HPV according to the USPSTF recommendations.  Mammogram was ordered because of the combination of her age and risk factors, according to ACOG guidelines.  Gonorrhea and Chlamydia testing not  performed;  HIV test not offered, again according to guidelines.  Colonoscopy discussed according to ACS guideline.  We also discussed her obstetric history and CV risks.   Patient is to continue her medications as prescribed.  She should use albuterol inhaler more frequently as needed.  She will come back to see me in one year for her annual visit.  She can come back to see me sooner as necessary.  All of her questions were answered appropriately to her satisfaction.    We discussed possible menopause  LH, FSH, estradiol, TSH, prolactin  If menopausal, would remove IUD and discuss HRT

## 2023-08-30 ENCOUNTER — OFFICE VISIT (OUTPATIENT)
Dept: SPORTS MEDICINE | Facility: CLINIC | Age: 53
End: 2023-08-30
Payer: COMMERCIAL

## 2023-08-30 VITALS
SYSTOLIC BLOOD PRESSURE: 129 MMHG | WEIGHT: 154.31 LBS | DIASTOLIC BLOOD PRESSURE: 75 MMHG | HEART RATE: 61 BPM | BODY MASS INDEX: 25.68 KG/M2

## 2023-08-30 DIAGNOSIS — M17.10 PATELLOFEMORAL ARTHRITIS: Primary | ICD-10-CM

## 2023-08-30 PROCEDURE — 1160F PR REVIEW ALL MEDS BY PRESCRIBER/CLIN PHARMACIST DOCUMENTED: ICD-10-PCS | Mod: CPTII,S$GLB,, | Performed by: PHYSICIAN ASSISTANT

## 2023-08-30 PROCEDURE — 99999 PR PBB SHADOW E&M-EST. PATIENT-LVL III: ICD-10-PCS | Mod: PBBFAC,,, | Performed by: PHYSICIAN ASSISTANT

## 2023-08-30 PROCEDURE — 3074F SYST BP LT 130 MM HG: CPT | Mod: CPTII,S$GLB,, | Performed by: PHYSICIAN ASSISTANT

## 2023-08-30 PROCEDURE — 99999 PR PBB SHADOW E&M-EST. PATIENT-LVL III: CPT | Mod: PBBFAC,,, | Performed by: PHYSICIAN ASSISTANT

## 2023-08-30 PROCEDURE — 3078F DIAST BP <80 MM HG: CPT | Mod: CPTII,S$GLB,, | Performed by: PHYSICIAN ASSISTANT

## 2023-08-30 PROCEDURE — 99213 OFFICE O/P EST LOW 20 MIN: CPT | Mod: S$GLB,,, | Performed by: PHYSICIAN ASSISTANT

## 2023-08-30 PROCEDURE — 1159F PR MEDICATION LIST DOCUMENTED IN MEDICAL RECORD: ICD-10-PCS | Mod: CPTII,S$GLB,, | Performed by: PHYSICIAN ASSISTANT

## 2023-08-30 PROCEDURE — 4010F PR ACE/ARB THEARPY RXD/TAKEN: ICD-10-PCS | Mod: CPTII,S$GLB,, | Performed by: PHYSICIAN ASSISTANT

## 2023-08-30 PROCEDURE — 3008F BODY MASS INDEX DOCD: CPT | Mod: CPTII,S$GLB,, | Performed by: PHYSICIAN ASSISTANT

## 2023-08-30 PROCEDURE — 3078F PR MOST RECENT DIASTOLIC BLOOD PRESSURE < 80 MM HG: ICD-10-PCS | Mod: CPTII,S$GLB,, | Performed by: PHYSICIAN ASSISTANT

## 2023-08-30 PROCEDURE — 1159F MED LIST DOCD IN RCRD: CPT | Mod: CPTII,S$GLB,, | Performed by: PHYSICIAN ASSISTANT

## 2023-08-30 PROCEDURE — 1160F RVW MEDS BY RX/DR IN RCRD: CPT | Mod: CPTII,S$GLB,, | Performed by: PHYSICIAN ASSISTANT

## 2023-08-30 PROCEDURE — 3074F PR MOST RECENT SYSTOLIC BLOOD PRESSURE < 130 MM HG: ICD-10-PCS | Mod: CPTII,S$GLB,, | Performed by: PHYSICIAN ASSISTANT

## 2023-08-30 PROCEDURE — 4010F ACE/ARB THERAPY RXD/TAKEN: CPT | Mod: CPTII,S$GLB,, | Performed by: PHYSICIAN ASSISTANT

## 2023-08-30 PROCEDURE — 3008F PR BODY MASS INDEX (BMI) DOCUMENTED: ICD-10-PCS | Mod: CPTII,S$GLB,, | Performed by: PHYSICIAN ASSISTANT

## 2023-08-30 PROCEDURE — 99213 PR OFFICE/OUTPT VISIT, EST, LEVL III, 20-29 MIN: ICD-10-PCS | Mod: S$GLB,,, | Performed by: PHYSICIAN ASSISTANT

## 2024-01-16 ENCOUNTER — TELEPHONE (OUTPATIENT)
Dept: SPORTS MEDICINE | Facility: CLINIC | Age: 54
End: 2024-01-16
Payer: COMMERCIAL

## 2024-01-16 NOTE — TELEPHONE ENCOUNTER
Left a voice message for patient to let her know that I got a message to schedule her an appointment.  I do have an opening for tomorrow please call me at [phone] or sent me a message to schedule.

## 2024-01-17 ENCOUNTER — OFFICE VISIT (OUTPATIENT)
Dept: SPORTS MEDICINE | Facility: CLINIC | Age: 54
End: 2024-01-17
Payer: COMMERCIAL

## 2024-01-17 VITALS
WEIGHT: 164.81 LBS | BODY MASS INDEX: 27.46 KG/M2 | DIASTOLIC BLOOD PRESSURE: 73 MMHG | SYSTOLIC BLOOD PRESSURE: 121 MMHG | HEART RATE: 64 BPM | HEIGHT: 65 IN

## 2024-01-17 DIAGNOSIS — M25.561 CHRONIC PAIN OF BOTH KNEES: Primary | ICD-10-CM

## 2024-01-17 DIAGNOSIS — M25.562 CHRONIC PAIN OF BOTH KNEES: Primary | ICD-10-CM

## 2024-01-17 DIAGNOSIS — G89.29 CHRONIC PAIN OF BOTH KNEES: Primary | ICD-10-CM

## 2024-01-17 DIAGNOSIS — M25.561 PAIN IN BOTH KNEES, UNSPECIFIED CHRONICITY: Primary | ICD-10-CM

## 2024-01-17 DIAGNOSIS — M17.10 PATELLOFEMORAL ARTHRITIS: ICD-10-CM

## 2024-01-17 DIAGNOSIS — M25.562 PAIN IN BOTH KNEES, UNSPECIFIED CHRONICITY: Primary | ICD-10-CM

## 2024-01-17 PROCEDURE — 3078F DIAST BP <80 MM HG: CPT | Mod: CPTII,S$GLB,, | Performed by: PHYSICIAN ASSISTANT

## 2024-01-17 PROCEDURE — 1159F MED LIST DOCD IN RCRD: CPT | Mod: CPTII,S$GLB,, | Performed by: PHYSICIAN ASSISTANT

## 2024-01-17 PROCEDURE — 3008F BODY MASS INDEX DOCD: CPT | Mod: CPTII,S$GLB,, | Performed by: PHYSICIAN ASSISTANT

## 2024-01-17 PROCEDURE — 99213 OFFICE O/P EST LOW 20 MIN: CPT | Mod: 25,S$GLB,, | Performed by: PHYSICIAN ASSISTANT

## 2024-01-17 PROCEDURE — 20611 DRAIN/INJ JOINT/BURSA W/US: CPT | Mod: 50,S$GLB,, | Performed by: PHYSICIAN ASSISTANT

## 2024-01-17 PROCEDURE — 99999 PR PBB SHADOW E&M-EST. PATIENT-LVL IV: CPT | Mod: PBBFAC,,, | Performed by: PHYSICIAN ASSISTANT

## 2024-01-17 PROCEDURE — 1160F RVW MEDS BY RX/DR IN RCRD: CPT | Mod: CPTII,S$GLB,, | Performed by: PHYSICIAN ASSISTANT

## 2024-01-17 PROCEDURE — 3074F SYST BP LT 130 MM HG: CPT | Mod: CPTII,S$GLB,, | Performed by: PHYSICIAN ASSISTANT

## 2024-01-17 RX ORDER — TRIAMCINOLONE ACETONIDE 40 MG/ML
40 INJECTION, SUSPENSION INTRA-ARTICULAR; INTRAMUSCULAR
Status: DISCONTINUED | OUTPATIENT
Start: 2024-01-17 | End: 2024-01-17 | Stop reason: HOSPADM

## 2024-01-17 RX ORDER — BUPIVACAINE HYDROCHLORIDE 2.5 MG/ML
2 INJECTION, SOLUTION INFILTRATION; PERINEURAL
Status: DISCONTINUED | OUTPATIENT
Start: 2024-01-17 | End: 2024-01-17 | Stop reason: HOSPADM

## 2024-01-17 RX ADMIN — TRIAMCINOLONE ACETONIDE 40 MG: 40 INJECTION, SUSPENSION INTRA-ARTICULAR; INTRAMUSCULAR at 12:01

## 2024-01-17 RX ADMIN — BUPIVACAINE HYDROCHLORIDE 2 ML: 2.5 INJECTION, SOLUTION INFILTRATION; PERINEURAL at 12:01

## 2024-01-17 NOTE — PROGRESS NOTES
ESTABLISHED PATIENT    History 1/17/2024:  Jacque returns here today for follow-up evaluation of both knees.  She was given an intra-articular CSI at her last visit in July.  She received good relief from this injection and states that she was asymptomatic for approximately 5 months.  Her right knee improved during this time as well.  She has had recurrent pain and stiffness over the last month.    History 8/30/2023:  Jacque returns here today for follow-up evaluation of both knees.  She was given a left knee CSI at her last visit and physical therapy was ordered.  She has responded very well to the injection and is no longer having left knee pain.  She is having some occasional stiffness and posterior knee pain on the right side but states her symptoms are not constant.    History 7/17/2023:  53 y.o. 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 She states she is on her feet for at least 15 hours a day. She has been having bilateral knee pain for a few months now. She reports some swelling. She has pain while standing and stair climbing. She has not had any previous treatment.        - mechanical symptoms, - instability    Is affecting ADLs.  Pain is 7/10 at it's worst.        PAST MEDICAL HISTORY    History reviewed. No pertinent past medical history.    PAST SURGICAL HISTORY     History reviewed. No pertinent surgical history.    FAMILY HISTORY    Family History   Problem Relation Age of Onset    Diabetes Father     Hypertension Father        SOCIAL HISTORY    Social History     Socioeconomic History    Marital status:    Tobacco Use    Smoking status: Never    Smokeless tobacco: Never   Substance and Sexual Activity    Alcohol use: No    Drug use: No    Sexual activity: Yes     Partners: Male     Birth control/protection: I.U.D.   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 Take on an empty stomach first thing in the morning.  You may drink water with this medication but do not eat or drink anything else with it. (Patient not taking: Reported on 8/30/2023), Disp: 30 capsule, Rfl: 1    ibuprofen (ADVIL,MOTRIN) 600 MG tablet, Take 1 tablet (600 mg total) by mouth every 8 (eight) hours as needed for Pain. (Patient not taking: Reported on 8/30/2023), Disp: 30 tablet, Rfl: 0    meloxicam (MOBIC) 15 MG tablet, Take 1 tablet (15 mg total) by mouth once daily. (Patient not taking: Reported on 8/30/2023), Disp: 30 tablet, Rfl: 0    ondansetron (ZOFRAN-ODT) 4 MG TbDL, Take 1 tablet (4 mg total) by mouth every 4 (four) hours as needed (nausea/vomiting). (Patient not taking: Reported on 8/30/2023), Disp: 20 tablet, Rfl: 1    senna-docusate 8.6-50 mg (PERICOLACE) 8.6-50 mg per tablet, Take 2 tablets by mouth daily as needed for Constipation. (Patient not taking: Reported on 8/30/2023), Disp: 30 tablet, Rfl: 1    ALLERGIES     Review of patient's allergies indicates:  No Known Allergies      REVIEW OF SYSTEMS   Constitution: Negative. Negative for chills, fever and night sweats.   HENT: Negative for congestion and headaches.    Eyes: Negative for blurred vision, left vision loss and right vision loss.   Cardiovascular: Negative for chest pain and syncope.   Respiratory: Negative for cough and shortness of breath.    Endocrine: Negative for polydipsia, polyphagia and polyuria.   Hematologic/Lymphatic: Negative for bleeding problem. Does not bruise/bleed easily.   Skin: Negative for dry skin, itching and rash.   Musculoskeletal: Negative  "for falls. Positive for bilateral knee pain  Gastrointestinal: Negative for abdominal pain and bowel incontinence.   Genitourinary: Negative for bladder incontinence and nocturia.   Neurological: Negative for disturbances in coordination, loss of balance and seizures.   Psychiatric/Behavioral: Negative for depression. The patient does not have insomnia.    Allergic/Immunologic: Negative for hives and persistent infections.     PHYSICAL EXAMINATION    Vitals: /73   Pulse 64   Ht 5' 5" (1.651 m)   Wt 74.7 kg (164 lb 12.7 oz)   BMI 27.42 kg/m²     General: The patient appears active and healthy with no apparent physical problems.  No disturbance of mood or affect is demonstrated. Alert and Oriented.    HEENT: Eyes normal, pupils equally round, nose normal.    Resp: Equal and symmetrical chest rises. No wheezing    CV: Regular rate    Neck: Supple; nonpainful range of motion.    Extremities: no cyanosis, clubbing, edema, or diffuse swelling.  Palpable pulses, good capillary refill of the digits.  No coolness, discoloration, edema or obvious varicosities.    Skin: no lesions noted.    Lymphatic: no detected adenopathy in the upper or lower extremities.    Neurologic: normal mental status, normal reflexes, normal gait and balance.  Patient is alert and oriented to person, place and time.  No flaccidity or spasticity is noted.  No motor or sensory deficits are noted.  Light touch is intact    Orthopaedic:     KNEE EXAM - LEFT    Inspection:   Normal skin color and appearance with no scars, no ecchymosis and no effusion.      ROM:   0° - 145°.      Palpation:   There is no tenderness along medial plica, medial collateral ligament, pes bursa, iliotibial band, lateral collateral ligament, popliteal fossa, patellar tendon, or quadriceps tendon. Tender medial and lateral joint line    Stability: - anterior drawer, - Lachman, - pivot shift and - posterior drawer.      No instability with varus or valgus stress at 0° or " 30°. Negative dial  test at 30° and 90°.    Tests:   - Elzbietas test.  - patellar compression - grind test, + patellofemoral crepitus.  There is no patellar apprehension.     - J Sign. - Karlee's. - patellar tilt. - June. Lateral patella translation  2 quadrants. Medial patella translation 2 quadrants    Motor:   Quadriceps strength is 5/5 and hamstrings strength is 5/5. Hamstrings show no tightness.      Neuro:   Distal neurovascular status is normal    Vascular: Negative Homans and no palpable popliteal cords. 2+ pedal pulse with brisk cap refill    Gait Normal    KNEE EXAM - RIGHT    Inspection:   Normal skin color and appearance with no scars, no ecchymosis and no effusion.      ROM:   0° - 145°.      Palpation:   There is no tenderness along medial plica, medial collateral ligament, pes bursa, iliotibial band, lateral collateral ligament, popliteal fossa, patellar tendon, or quadriceps tendon. Tender medial and lateral joint line    Stability: - anterior drawer, - Lachman, - pivot shift and - posterior drawer.      No instability with varus or valgus stress at 0° or 30°. Negative dial  test at 30° and 90°.    Tests:   - Elzbietas test.  - patellar compression - grind test, + patellofemoral crepitus.  There is no patellar apprehension.     - J Sign. - Karlee's. - patellar tilt. - June. Lateral patella translation  2 quadrants. Medial patella translation 2 quadrants    Motor:   Quadriceps strength is 5/5 and hamstrings strength is 5/5. Hamstrings show no tightness.      Neuro:   Distal neurovascular status is normal    Vascular: Negative Homans and no palpable popliteal cords. 2+ pedal pulse with brisk cap refill    Gait Normal    IMAGING    X-ray Knee Ortho Bilateral with Flexion  Order: [id number]  Status: Final result     Visible to patient: Yes (seen)     Next appt: None     Dx: Chronic pain of both knees     0 Result Notes  Details    Reading Physician Reading Date Result Priority   Frandy Gomez,  MD  [phone] 7/17/2023 Routine     Narrative & Impression  EXAMINATION:  XR KNEE ORTHO BILAT WITH FLEXION     CLINICAL HISTORY:  Pain in right knee     TECHNIQUE:  AP standing of both knees, PA flexion standing views of both knees, and Merchant views of both knees were performed.  Lateral views of both knees were also performed.     COMPARISON:  None     FINDINGS:  Joint spaces are maintained.  Bony structures are intact and no bony abnormalities seen.     Impression:     See above        Electronically signed by: Frandy Gomez MD  Date:                                            07/17/2023  Time:                                           10:46           Exam Ended: 07/17/23 10:40 Last Resulted: 07/17/23 10:46               IMPRESSION       ICD-10-CM ICD-9-CM   1. Chronic pain of both knees  M25.561 719.46    M25.562 338.29    G89.29    2. Patellofemoral arthritis  M17.10 716.96           MEDICATIONS PRESCRIBED      Mobic 15mg    RECOMMENDATIONS       I have had a lengthy discussion with the patient and her son, who is her , today regarding her prognosis and treatment options  We discussed repeat CSI, injections, physical therapy, and ultimately total knee replacement  She would like to continue conservative treatment for now and elected to proceed with an intra-articular CSI into both knees done under ultrasound guidance today  She would also like to undergo a course of physical therapy focused on knee mobility and strengthening  Continue activity modification, ice and elevation, and NSAIDs as needed for pain  RTC in 6 weeks; may consider HA injections in the future if needed      Sports Medicine US - Guidance for Needle Placement    Date/Time: 1/17/2024 12:30 PM    Performed by: Giovanni Coombs PA-C  Authorized by: Giovanni Coombs PA-C  Preparation: Patient was prepped and draped in the usual sterile fashion.  Local anesthesia used: yes    Anesthesia:  Local anesthesia used: yes  Local  Anesthetic: co-phenylcaine spray    Sedation:  Patient sedated: no    Patient tolerance: patient tolerated the procedure well with no immediate complications      Large Joint Aspiration/Injection: bilateral knee    Date/Time: 1/17/2024 12:30 PM    Performed by: Giovanni Coombs PA-C  Authorized by: Giovanni Coombs PA-C    Consent Done?:  Yes (Verbal)  Indications:  Pain  Site marked: the procedure site was marked    Timeout: prior to procedure the correct patient, procedure, and site was verified    Prep: patient was prepped and draped in usual sterile fashion      Local anesthesia used?: Yes    Local anesthetic:  Co-phenylcaine spray    Details:  Needle Size:  22 G  Ultrasonic Guidance for needle placement?: Yes (Ultrasound guidance was utilized for needle localization.  Dynamic visualization of the needle was continuous throughout the procedure and maintained accurate placement.  Images were saved and stored for documentation.)    Images are saved and documented.  Approach: Superolateral.  Location:  Knee (Bilateral Knees)  Laterality:  Bilateral  Site:  Bilateral knee  Medications (Right):  40 mg triamcinolone acetonide 40 mg/mL; 2 mL BUPivacaine 0.25% (2.5 mg/ml) 0.25 % (2.5 mg/mL)  Medications (Left):  40 mg triamcinolone acetonide 40 mg/mL; 2 mL BUPivacaine 0.25% (2.5 mg/ml) 0.25 % (2.5 mg/mL)  Patient tolerance:  Patient tolerated the procedure well with no immediate complications          All questions were answered, pt will contact us for questions or concerns in the interim.

## 2024-04-15 NOTE — PROGRESS NOTES
Patient ID: [id number]     Chief Complaint: Hypertension (6 month check up)      HPI:     Mellissa Ann Milligan is a 41 y.o. female here today for a sixth month follow up for HTN. The patient is compliant with medications. She reports having increasing numbness and tingling in feet. She reports this is an ongoing problem. She wears flip flops daily. Denies any chest pain, SOB, or dizziness. No other complaints today.     Past Medical History:   Diagnosis Date    Asthma     Chronic sinusitis, unspecified     Dietary iron deficiency     Essential (primary) hypertension     Eustachian tube dysfunction, bilateral     DOMINIQUE (iron deficiency anemia)     Leukocytosis     Morbid obesity     Nasal congestion     Nasal polyps     Obesity hypoventilation syndrome     Obstructive sleep apnea     JOSEPH on CPAP     Prediabetes         Past Surgical History:   Procedure Laterality Date     SECTION      CHOLECYSTECTOMY      COLONOSCOPY  07/15/2021    Dr Ricky Hale    ESOPHAGOGASTRODUODENOSCOPY  2021    Dr Ricky Hale    FESS, WITH DACRYOCYSTORHINOSTOMY      NASAL TURBINATE REDUCTION      SINUS SURGERY          Social History     Socioeconomic History    Marital status:    Tobacco Use    Smoking status: Never    Smokeless tobacco: Never   Substance and Sexual Activity    Alcohol use: Never    Drug use: Never    Sexual activity: Not Currently     Social Determinants of Health     Financial Resource Strain: Medium Risk (10/13/2023)    Overall Financial Resource Strain (CARDIA)     Difficulty of Paying Living Expenses: Somewhat hard   Food Insecurity: No Food Insecurity (10/13/2023)    Hunger Vital Sign     Worried About Running Out of Food in the Last Year: Never true     Ran Out of Food in the Last Year: Never true   Transportation Needs: No Transportation Needs (10/13/2023)    PRAPARE - Transportation     Lack of Transportation (Medical): No     Lack of Transportation (Non-Medical): No   Physical Activity: Inactive  ESTABLISHED PATIENT    History 8/30/2023:  Jacque returns here today for follow-up evaluation of both knees.  She was given a left knee CSI at her last visit and physical therapy was ordered.  She has responded very well to the injection and is no longer having left knee pain.  She is having some occasional stiffness and posterior knee pain on the right side but states her symptoms are not constant.    History 7/17/2023:  52 y.o. 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 She states she is on her feet for at least 15 hours a day. She has been having bilateral knee pain for a few months now. She reports some swelling. She has pain while standing and stair climbing. She has not had any previous treatment.        - mechanical symptoms, - instability    Is affecting ADLs.  Pain is 7/10 at it's worst.        PAST MEDICAL HISTORY    History reviewed. No pertinent past medical history.    PAST SURGICAL HISTORY     History reviewed. No pertinent surgical history.    FAMILY HISTORY    Family History   Problem Relation Age of Onset    Diabetes Father     Hypertension Father        SOCIAL HISTORY    Social History     Socioeconomic History    Marital status:    Tobacco Use    Smoking status: Never    Smokeless tobacco: Never   Substance and Sexual Activity    Alcohol use: No    Drug use: No    Sexual activity: Yes     Partners: Male     Birth control/protection: I.U.D.   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 Take on an empty stomach first thing in the morning.  You may drink water with this medication but do not eat or drink anything else with it.  (10/13/2023)    Exercise Vital Sign     Days of Exercise per Week: 0 days     Minutes of Exercise per Session: 0 min   Stress: No Stress Concern Present (10/13/2023)    [employer] - Occupational Stress Questionnaire     Feeling of Stress : Not at all   Social Connections: Moderately Isolated (10/13/2023)    Social Connection and Isolation Panel [NHANES]     Frequency of Communication with Friends and Family: More than three times a week     Frequency of Social Gatherings with Friends and Family: More than three times a week     Attends Worship Services: Never     Active Member of Clubs or Organizations: No     Attends Club or Organization Meetings: Never     Marital Status:    Housing Stability: Low Risk  (10/13/2023)    Housing Stability Vital Sign     Unable to Pay for Housing in the Last Year: No     Number of Places Lived in the Last Year: 1     Unstable Housing in the Last Year: No        Current Outpatient Medications   Medication Instructions    albuterol (PROVENTIL HFA) 90 mcg/actuation inhaler 2 puffs, Inhalation, Every 4 hours PRN    albuterol-ipratropium (DUO-NEB) 2.5 mg-0.5 mg/3 mL nebulizer solution NEBULIZE 1 VIAL 4 TIMES DAILY    budesonide-formoterol 160-4.5 mcg (SYMBICORT) 160-4.5 mcg/actuation HFAA 2 puffs, 2 times daily    ferrous gluconate (FERGON) 324 MG tablet TAKE 1 TABLET BY MOUTH TWICE A DAY    fluticasone propionate (FLONASE) 100 mcg, Each Nostril, Daily    gabapentin (NEURONTIN) 100 mg, Oral, 3 times daily    losartan (COZAAR) 50 mg, Oral, Daily    pantoprazole (PROTONIX) 40 mg, Oral, Daily    tiotropium (SPIRIVA WITH HANDIHALER) 18 mcg inhalation capsule INHALE 2 INHALATIONS OF ONE CAPSULE ONCE DAILY       Review of patient's allergies indicates:  No Known Allergies     Patient Care Team:  Sebastian Davila DO as PCP - General (Family Medicine)  Thee Raymond MD as Consulting Physician (Otolaryngology)  Ricky Hale MD as Consulting  (Patient not taking: Reported on 8/30/2023), Disp: 30 capsule, Rfl: 1    ibuprofen (ADVIL,MOTRIN) 600 MG tablet, Take 1 tablet (600 mg total) by mouth every 8 (eight) hours as needed for Pain. (Patient not taking: Reported on 8/30/2023), Disp: 30 tablet, Rfl: 0    INTRAUTERINE DEVICE, IUD, IU, by Intrauterine route., Disp: , Rfl:     losartan (COZAAR) 25 MG tablet, Take 25 mg by mouth., Disp: , Rfl:     meloxicam (MOBIC) 15 MG tablet, Take 1 tablet (15 mg total) by mouth once daily. (Patient not taking: Reported on 8/30/2023), Disp: 30 tablet, Rfl: 0    ondansetron (ZOFRAN-ODT) 4 MG TbDL, Take 1 tablet (4 mg total) by mouth every 4 (four) hours as needed (nausea/vomiting). (Patient not taking: Reported on 8/30/2023), Disp: 20 tablet, Rfl: 1    senna-docusate 8.6-50 mg (PERICOLACE) 8.6-50 mg per tablet, Take 2 tablets by mouth daily as needed for Constipation. (Patient not taking: Reported on 8/30/2023), Disp: 30 tablet, Rfl: 1    ALLERGIES     Review of patient's allergies indicates:  No Known Allergies      REVIEW OF SYSTEMS   Constitution: Negative. Negative for chills, fever and night sweats.   HENT: Negative for congestion and headaches.    Eyes: Negative for blurred vision, left vision loss and right vision loss.   Cardiovascular: Negative for chest pain and syncope.   Respiratory: Negative for cough and shortness of breath.    Endocrine: Negative for polydipsia, polyphagia and polyuria.   Hematologic/Lymphatic: Negative for bleeding problem. Does not bruise/bleed easily.   Skin: Negative for dry skin, itching and rash.   Musculoskeletal: Negative for falls. Positive for bilateral knee pain  Gastrointestinal: Negative for abdominal pain and bowel incontinence.   Genitourinary: Negative for bladder incontinence and nocturia.   Neurological: Negative for disturbances in coordination, loss of balance and seizures.   Psychiatric/Behavioral: Negative for depression. The patient does not have insomnia.     Allergic/Immunologic: Negative for hives and persistent infections.     PHYSICAL EXAMINATION    Vitals: /75   Pulse 61   Wt 70 kg (154 lb 5.2 oz)   BMI 25.68 kg/m²     General: The patient appears active and healthy with no apparent physical problems.  No disturbance of mood or affect is demonstrated. Alert and Oriented.    HEENT: Eyes normal, pupils equally round, nose normal.    Resp: Equal and symmetrical chest rises. No wheezing    CV: Regular rate    Neck: Supple; nonpainful range of motion.    Extremities: no cyanosis, clubbing, edema, or diffuse swelling.  Palpable pulses, good capillary refill of the digits.  No coolness, discoloration, edema or obvious varicosities.    Skin: no lesions noted.    Lymphatic: no detected adenopathy in the upper or lower extremities.    Neurologic: normal mental status, normal reflexes, normal gait and balance.  Patient is alert and oriented to person, place and time.  No flaccidity or spasticity is noted.  No motor or sensory deficits are noted.  Light touch is intact    Orthopaedic:     KNEE EXAM - LEFT    Inspection:   Normal skin color and appearance with no scars, no ecchymosis and no effusion.      ROM:   0° - 145°.      Palpation:   There is no tenderness along medial plica, medial collateral ligament, pes bursa, iliotibial band, lateral collateral ligament, popliteal fossa, patellar tendon, or quadriceps tendon. Tender medial and lateral joint line    Stability: - anterior drawer, - Lachman, - pivot shift and - posterior drawer.      No instability with varus or valgus stress at 0° or 30°. Negative dial  test at 30° and 90°.    Tests:   - Elzbietas test.  - patellar compression - grind test, + patellofemoral crepitus.  There is no patellar apprehension.     - J Sign. - Karlee's. - patellar tilt. - June. Lateral patella translation  2 quadrants. Medial patella translation 2 quadrants    Motor:   Quadriceps strength is 5/5 and hamstrings strength is 5/5.  "Physician (Gastroenterology)  Marco Armstrong MD as Consulting Physician (Pulmonary Disease)     Subjective:     Review of Systems    12 point review of systems conducted, negative except as stated in the history of present illness. See HPI for details.    Objective:     Visit Vitals  /84 (BP Location: Right arm, Patient Position: Sitting, BP Method: Large (Automatic))   Pulse 73   Temp 97.9 °F (36.6 °C)   Resp 20   Ht 4' 11" (1.499 m)   Wt 121.4 kg (267 lb 9.6 oz)   LMP 04/01/2024   SpO2 (!) 94%   BMI 54.05 kg/m²       Physical Exam  Vitals and nursing note reviewed.   Constitutional:       General: She is not in acute distress.     Appearance: She is not ill-appearing.   HENT:      Head: Normocephalic and atraumatic.      Mouth/Throat:      Mouth: Mucous membranes are moist.      Pharynx: Oropharynx is clear.   Eyes:      General: No scleral icterus.     Extraocular Movements: Extraocular movements intact.      Conjunctiva/sclera: Conjunctivae normal.      Pupils: Pupils are equal, round, and reactive to light.   Neck:      Vascular: No carotid bruit.   Cardiovascular:      Rate and Rhythm: Normal rate and regular rhythm.      Pulses:           Dorsalis pedis pulses are 2+ on the right side and 2+ on the left side.        Posterior tibial pulses are 2+ on the right side and 2+ on the left side.      Heart sounds: No murmur heard.     No friction rub. No gallop.   Pulmonary:      Effort: Pulmonary effort is normal. No respiratory distress.      Breath sounds: Normal breath sounds. No wheezing, rhonchi or rales.   Abdominal:      General: Abdomen is flat. Bowel sounds are normal. There is no distension.      Palpations: Abdomen is soft. There is no mass.      Tenderness: There is no abdominal tenderness.   Musculoskeletal:         General: Normal range of motion.      Cervical back: Normal range of motion and neck supple.      Right foot: No deformity.      Left foot: No deformity.   Feet:      Right foot:    "   Protective Sensation: 5 sites tested.  3 sites sensed.      Skin integrity: Skin integrity normal. No ulcer, blister, skin breakdown, erythema, warmth, callus, dry skin or fissure.      Toenail Condition: Right toenails are normal.      Left foot:      Protective Sensation: 5 sites tested.  3 sites sensed.      Skin integrity: Skin integrity normal. No ulcer, blister, skin breakdown, erythema, warmth, callus, dry skin or fissure.      Toenail Condition: Left toenails are normal.   Skin:     General: Skin is warm and dry.   Neurological:      General: No focal deficit present.      Mental Status: She is alert.   Psychiatric:         Mood and Affect: Mood normal.         Labs Reviewed:     Chemistry:  Lab Results   Component Value Date     10/03/2023    K 4.0 10/03/2023    CHLORIDE 105 10/03/2023    BUN 11.0 10/03/2023    CREATININE 0.74 10/03/2023    EGFRNORACEVR >60 10/03/2023    GLUCOSE 111 (H) 10/03/2023    CALCIUM 9.8 10/03/2023    ALKPHOS 84 10/03/2023    LABPROT 7.5 10/03/2023    ALBUMIN 3.6 10/03/2023    BILIDIR 0.1 09/30/2021    IBILI 0.10 09/30/2021    AST 34 10/03/2023    ALT 37 10/03/2023    FURPTWHQ28VK 30.1 10/03/2023    TSH 1.311 10/03/2023    FMPOQH2SNUA 1.25 08/24/2017        Lab Results   Component Value Date    HGBA1C 5.5 10/03/2023        Hematology:  Lab Results   Component Value Date    WBC 10.16 10/03/2023    HGB 13.5 10/03/2023    HCT 42.2 10/03/2023     10/03/2023       Lipid Panel:  Lab Results   Component Value Date    CHOL 159 10/03/2023    HDL 34 (L) 10/03/2023    .00 10/03/2023    TRIG 92 10/03/2023    TOTALCHOLEST 5 10/03/2023        Urine:  Lab Results   Component Value Date    APPEARANCEUA CLEAR 07/02/2019    PROTEINUA Negative 07/02/2019    LEUKOCYTESUR Negative 07/02/2019    RBCUA 0-3 07/02/2019    WBCUA None Seen 07/02/2019    BACTERIA Rare (A) 07/02/2019        Assessment:       ICD-10-CM ICD-9-CM   1. Primary hypertension  I10 401.9   2. Numbness and tingling  Hamstrings show no tightness.      Neuro:   Distal neurovascular status is normal    Vascular: Negative Homans and no palpable popliteal cords. 2+ pedal pulse with brisk cap refill    Gait Normal    KNEE EXAM - RIGHT    Inspection:   Normal skin color and appearance with no scars, no ecchymosis and no effusion.      ROM:   0° - 145°.      Palpation:   There is no tenderness along medial plica, medial collateral ligament, pes bursa, iliotibial band, lateral collateral ligament, popliteal fossa, patellar tendon, or quadriceps tendon. Tender medial and lateral joint line    Stability: - anterior drawer, - Lachman, - pivot shift and - posterior drawer.      No instability with varus or valgus stress at 0° or 30°. Negative dial  test at 30° and 90°.    Tests:   - Elzbietas test.  - patellar compression - grind test, + patellofemoral crepitus.  There is no patellar apprehension.     - J Sign. - Karlee's. - patellar tilt. - June. Lateral patella translation  2 quadrants. Medial patella translation 2 quadrants    Motor:   Quadriceps strength is 5/5 and hamstrings strength is 5/5. Hamstrings show no tightness.      Neuro:   Distal neurovascular status is normal    Vascular: Negative Homans and no palpable popliteal cords. 2+ pedal pulse with brisk cap refill    Gait Normal    IMAGING    X-ray Knee Ortho Bilateral with Flexion  Order: [id number]  Status: Final result     Visible to patient: Yes (seen)     Next appt: None     Dx: Chronic pain of both knees     0 Result Notes  Details    Reading Physician Reading Date Result Priority   Frandy Gomez MD  [phone] 7/17/2023 Routine     Narrative & Impression  EXAMINATION:  XR KNEE ORTHO BILAT WITH FLEXION     CLINICAL HISTORY:  Pain in right knee     TECHNIQUE:  AP standing of both knees, PA flexion standing views of both knees, and Merchant views of both knees were performed.  Lateral views of both knees were also performed.     COMPARISON:  None     FINDINGS:  Joint  of both feet  R20.0 782.0    R20.2    3. Asthma, unspecified asthma severity, unspecified whether complicated, unspecified whether persistent  J45.909 493.90   4. Nasal congestion  R09.81 478.19        Plan:     1. Primary hypertension  Assessment & Plan:  Well controlled.   Continue Losartan 50 mg daily.   Low Sodium Diet (DASH Diet - Less than 2 grams of sodium per day).  Monitor blood pressure daily and log. Report consistent numbers greater than 140/90.  Maintain healthy weight with goal BMI <30. Exercise 30 minutes per day, 5 days per week.      Orders:  -     losartan (COZAAR) 50 MG tablet; Take 1 tablet (50 mg total) by mouth once daily.  Dispense: 30 tablet; Refill: 5    2. Numbness and tingling of both feet  Assessment & Plan:  Start Gabapentin 100 mg TID.   Advised the patient to wear shoes that protect skin and feet.       Orders:  -     gabapentin (NEURONTIN) 100 MG capsule; Take 1 capsule (100 mg total) by mouth 3 (three) times daily.  Dispense: 90 capsule; Refill: 5  -     Comprehensive Metabolic Panel; Future; Expected date: 04/15/2024  -     Hemoglobin A1C; Future; Expected date: 04/15/2024    3. Asthma, unspecified asthma severity, unspecified whether complicated, unspecified whether persistent  -     albuterol (PROVENTIL HFA) 90 mcg/actuation inhaler; Inhale 2 puffs into the lungs every 4 (four) hours as needed for Wheezing or Shortness of Breath.  Dispense: 6.7 g; Refill: 1    4. Nasal congestion  -     fluticasone propionate (FLONASE) 50 mcg/actuation nasal spray; 2 sprays (100 mcg total) by Each Nostril route once daily.  Dispense: 9.9 mL; Refill: 5       No follow-ups on file. In addition to their scheduled follow up, the patient has also been instructed to follow up on as needed basis.     Future Appointments   Date Time Provider Department Center   10/15/2024 10:20 AM Star Odonnell NP [facility]          Star Odonnell NP     spaces are maintained.  Bony structures are intact and no bony abnormalities seen.     Impression:     See above        Electronically signed by: Frandy Gomez MD  Date:                                            07/17/2023  Time:                                           10:46           Exam Ended: 07/17/23 10:40 Last Resulted: 07/17/23 10:46               IMPRESSION       ICD-10-CM ICD-9-CM   1. Patellofemoral arthritis  M17.10 716.96         MEDICATIONS PRESCRIBED      Mobic 15mg    RECOMMENDATIONS       Continue activity modification, ice and elevation, and NSAIDs as needed for pain  RTC as needed; can repeat the CSI in 3 months if needed or consider HA injections in the future    Procedures        All questions were answered, pt will contact us for questions or concerns in the interim.

## 2024-05-31 ENCOUNTER — HOSPITAL ENCOUNTER (OUTPATIENT)
Dept: RADIOLOGY | Facility: HOSPITAL | Age: 54
Discharge: HOME | End: 2024-05-31
Attending: OBSTETRICS & GYNECOLOGY
Payer: COMMERCIAL

## 2024-05-31 DIAGNOSIS — Z12.31 SCREENING MAMMOGRAM, ENCOUNTER FOR: ICD-10-CM

## 2024-05-31 PROCEDURE — 77067 SCR MAMMO BI INCL CAD: CPT | Mod: 26,,, | Performed by: RADIOLOGY

## 2024-05-31 PROCEDURE — 77063 BREAST TOMOSYNTHESIS BI: CPT | Mod: TC

## 2024-05-31 PROCEDURE — 77063 BREAST TOMOSYNTHESIS BI: CPT | Mod: 26,,, | Performed by: RADIOLOGY

## 2024-06-10 ENCOUNTER — OFFICE VISIT (OUTPATIENT)
Dept: OBSTETRICS AND GYNECOLOGY | Facility: CLINIC | Age: 54
End: 2024-06-10
Payer: COMMERCIAL

## 2024-06-10 VITALS
DIASTOLIC BLOOD PRESSURE: 64 MMHG | BODY MASS INDEX: 26 KG/M2 | WEIGHT: 156.06 LBS | SYSTOLIC BLOOD PRESSURE: 124 MMHG | HEIGHT: 65 IN

## 2024-06-10 DIAGNOSIS — Z30.432 ENCOUNTER FOR IUD REMOVAL: Primary | ICD-10-CM

## 2024-06-10 PROBLEM — R19.8 RIGHT PELVIC ADNEXAL FLUID COLLECTION: Status: RESOLVED | Noted: 2023-07-31 | Resolved: 2024-06-10

## 2024-06-10 PROCEDURE — 99499 UNLISTED E&M SERVICE: CPT | Mod: S$GLB,,, | Performed by: OBSTETRICS & GYNECOLOGY

## 2024-06-10 PROCEDURE — 99999 PR PBB SHADOW E&M-EST. PATIENT-LVL III: CPT | Mod: PBBFAC,,, | Performed by: OBSTETRICS & GYNECOLOGY

## 2024-06-10 PROCEDURE — 58301 REMOVE INTRAUTERINE DEVICE: CPT | Mod: S$GLB,,, | Performed by: OBSTETRICS & GYNECOLOGY

## 2024-06-10 NOTE — PROCEDURES
Removal of Intrauterine Device    Date/Time: 6/10/2024 9:15 AM    Performed by: Anthony Fernandez MD  Authorized by: Anthony Fernandez MD    Consent obtained:  Prior to procedure the appropriate consent was completed and verified  Consent given by:  Patient  Procedure risks and benefits discussed: yes    Patient questions answered: yes    Patient agrees, verbalizes understanding, and wants to proceed: yes    Educational handouts given: yes    Instructions and paperwork completed: yes    Implant grasped by: ring forceps  Removal due to infection and inflammatory reaction: no    Other reason for removal:  Menopausal  Removal due to mechanical complications of IUD/Nexplanon: no    Removed with no complications: yes    Removal due to expiration: yes      IUD REMOVAL NOTES    We begins with a consultation.  Her IUD has an expiration date of 2024.  It was time for removal.    Patient is menopausal.  No cycle for the past two years.    Before removing the IUD, we have obtained consent from the patient after explaining the procedure.  Other alternative methods of contraception also discussed.    A clean speculum was then placed.  The cervix and upper vaginal vault were cleaned of mucous and debris.  The strings were clearly seen.  They were grasped with ring forceps.  Slight traction was applied steadily until the IUD was removed.  The device was then shown to the patient.  The patient tolerated the procedure well without any incident.      Postprocedure pain was rated at 1/10.   Educational material was given.    Back for her annual visit in August 2024

## 2024-08-29 ENCOUNTER — OFFICE VISIT (OUTPATIENT)
Dept: OBSTETRICS AND GYNECOLOGY | Facility: CLINIC | Age: 54
End: 2024-08-29
Payer: COMMERCIAL

## 2024-08-29 VITALS
WEIGHT: 149.94 LBS | BODY MASS INDEX: 24.98 KG/M2 | DIASTOLIC BLOOD PRESSURE: 80 MMHG | SYSTOLIC BLOOD PRESSURE: 130 MMHG | HEIGHT: 65 IN

## 2024-08-29 DIAGNOSIS — Z01.419 WELL WOMAN EXAM WITH ROUTINE GYNECOLOGICAL EXAM: Primary | ICD-10-CM

## 2024-08-29 PROBLEM — R05.3 CHRONIC COUGH: Status: RESOLVED | Noted: 2021-04-07 | Resolved: 2024-08-29

## 2024-08-29 PROBLEM — R09.81 NASAL CONGESTION: Status: RESOLVED | Noted: 2021-04-07 | Resolved: 2024-08-29

## 2024-08-29 PROCEDURE — 3075F SYST BP GE 130 - 139MM HG: CPT | Mod: CPTII,S$GLB,, | Performed by: OBSTETRICS & GYNECOLOGY

## 2024-08-29 PROCEDURE — 87624 HPV HI-RISK TYP POOLED RSLT: CPT | Performed by: OBSTETRICS & GYNECOLOGY

## 2024-08-29 PROCEDURE — 4010F ACE/ARB THERAPY RXD/TAKEN: CPT | Mod: CPTII,S$GLB,, | Performed by: OBSTETRICS & GYNECOLOGY

## 2024-08-29 PROCEDURE — 3008F BODY MASS INDEX DOCD: CPT | Mod: CPTII,S$GLB,, | Performed by: OBSTETRICS & GYNECOLOGY

## 2024-08-29 PROCEDURE — 99396 PREV VISIT EST AGE 40-64: CPT | Mod: S$GLB,,, | Performed by: OBSTETRICS & GYNECOLOGY

## 2024-08-29 PROCEDURE — 88175 CYTOPATH C/V AUTO FLUID REDO: CPT | Performed by: OBSTETRICS & GYNECOLOGY

## 2024-08-29 PROCEDURE — 1159F MED LIST DOCD IN RCRD: CPT | Mod: CPTII,S$GLB,, | Performed by: OBSTETRICS & GYNECOLOGY

## 2024-08-29 PROCEDURE — 1160F RVW MEDS BY RX/DR IN RCRD: CPT | Mod: CPTII,S$GLB,, | Performed by: OBSTETRICS & GYNECOLOGY

## 2024-08-29 PROCEDURE — 3079F DIAST BP 80-89 MM HG: CPT | Mod: CPTII,S$GLB,, | Performed by: OBSTETRICS & GYNECOLOGY

## 2024-08-29 PROCEDURE — 99999 PR PBB SHADOW E&M-EST. PATIENT-LVL III: CPT | Mod: PBBFAC,,, | Performed by: OBSTETRICS & GYNECOLOGY

## 2024-08-29 NOTE — PROGRESS NOTES
Subjective:       Patient ID: Jacque Sanford is a 53 y.o. female.    Chief Complaint:  Well Woman (Last normal pap with Negative HPV was 2021 and last normal mammogram was 2024.)      History of Present Illness  HPI  Annual Exam-Postmenopausal  Patient presents for annual exam. The patient is sexually active. GYN screening history: last pap: approximate date 2021 and was normal and last mammogram: approximate date 2024 and was normal. The patient wears seatbelts: yes. The patient participates in regular exercise: yes. Has the patient ever been transfused or tattooed?: no. The patient reports that there is not domestic violence in her life.  No postmenopausal bleeding    Now with hot flashes, vaginal dryness with dyspareunia and irritability.  Decreased libido.  Not on HRT    Colonoscopy in .        GYN & OB History  Patient's last menstrual period was 2021 (exact date).   Date of Last Pap: 2021    OB History    Para Term  AB Living   4 2 2   2     SAB IAB Ectopic Multiple Live Births   2              # Outcome Date GA Lbr Daryl/2nd Weight Sex Type Anes PTL Lv   4 Term            3 Term            2 SAB            1 SAB              No past medical history on file.    No past surgical history on file.    Family History   Problem Relation Name Age of Onset    Diabetes Father      Hypertension Father         Social History     Socioeconomic History    Marital status:    Tobacco Use    Smoking status: Never    Smokeless tobacco: Never   Substance and Sexual Activity    Alcohol use: No    Drug use: No    Sexual activity: Yes     Partners: Male     Birth control/protection: I.U.D.   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      losartan  (COZAAR) 25 MG tablet Take 25 mg by mouth.       No current facility-administered medications for this visit.       Review of patient's allergies indicates:  No Known Allergies    Review of Systems  Review of Systems   Constitutional:  Negative for activity change, appetite change, chills, fatigue, fever and unexpected weight change.   HENT:  Negative for mouth sores.    Respiratory:  Negative for cough and shortness of breath.    Cardiovascular:  Negative for chest pain and palpitations.   Gastrointestinal:  Negative for abdominal pain, bloating, blood in stool, constipation, nausea and vomiting.   Endocrine: Positive for hot flashes. Negative for diabetes.   Genitourinary:  Positive for decreased libido, dyspareunia, hot flashes and vaginal dryness. Negative for dysmenorrhea, dysuria, frequency, hematuria, menorrhagia, menstrual problem, pelvic pain, urgency, vaginal bleeding, vaginal discharge, vaginal pain, urinary incontinence, postcoital bleeding and vaginal odor.   Musculoskeletal:  Negative for back pain and myalgias.   Integumentary:  Negative for rash, breast mass and nipple discharge.   Neurological:  Negative for seizures and headaches.   Psychiatric/Behavioral:  Negative for depression and sleep disturbance. The patient is not nervous/anxious.    Breast: Negative for mass, mastodynia and nipple discharge          Objective:    Physical Exam:   Constitutional: She appears well-developed and well-nourished. No distress.   BMI of 24.95    HENT:   Head: Normocephalic and atraumatic.    Eyes: EOM are normal.      Pulmonary/Chest: Effort normal. No respiratory distress.   Breasts: Non-tender, no engorgement, no masses, no retraction, no discharge. Negative for lymphadenopathy.         Abdominal: Soft. She exhibits no distension. There is no abdominal tenderness. There is no rebound and no guarding.     Genitourinary:    Vagina normal.   No vaginal discharge in the vagina.    Genitourinary Comments: Vulva  without any obvious lesions.  Urethral meatus normal size and location without any lesion.  Urethra is non-tender without stricture or discharge.  Bladder is non-tender.  Vaginal vault with good support.  Minimal white discharge noted.  No obvious lesion.  Normal rugation.  Cervix is without any cervical motion tenderness.  No obvious lesion.  IUD strings visible.  Uterus is about 10 weeks, non-tender, normal contour.  Adnexa is without any masses or tenderness.  Perineum without obvious lesion.               Musculoskeletal: Normal range of motion.       Neurological: She is alert.    Skin: Skin is warm and dry.    Psychiatric: She has a normal mood and affect.          Assessment:        1. Well woman exam with routine gynecological exam            Plan:          I have discussed with the patient her condition.  Monthly breast examination was instructed, discussed, and encouraged.  Patient was encouraged to consume a low-calorie, low fat diet, and to increase of physical activity.  Healthy habits encouraged.  A Pap smear was performed with HR-HPV according to the USPSTF recommendations.  Mammogram was not ordered because it was performed recently.  Gonorrhea and Chlamydia testing not  performed;  HIV test not offered, again according to guidelines.  Colonoscopy discussed according to ACS guideline.  We also discussed her obstetric history and CV risks.   Care gaps addressed.  She should get her shingle vaccine soon  Patient is to continue her medications as prescribed.  She should use albuterol inhaler more frequently as needed.  She will come back to see me in one year for her annual visit.  She can come back to see me sooner as necessary.  All of her questions were answered appropriately to her satisfaction.      ** A female chaperone, Laverne Winslow, was present for the pelvic exam

## 2024-08-30 LAB
CLINICAL INFO: NORMAL
DATE OF PREVIOUS PAP: NORMAL
DATE PREVIOUS BX: NO
LMP START DATE: NORMAL
SPECIMEN SOURCE CVX/VAG CYTO: NORMAL

## 2025-07-15 ENCOUNTER — HOSPITAL ENCOUNTER (OUTPATIENT)
Dept: RADIOLOGY | Facility: HOSPITAL | Age: 55
Discharge: HOME | End: 2025-07-15
Attending: OBSTETRICS & GYNECOLOGY
Payer: COMMERCIAL

## 2025-07-15 DIAGNOSIS — Z12.31 ENCOUNTER FOR SCREENING MAMMOGRAM FOR BREAST CANCER: ICD-10-CM

## 2025-07-15 PROCEDURE — 77067 SCR MAMMO BI INCL CAD: CPT | Mod: TC

## 2025-07-15 PROCEDURE — 77063 BREAST TOMOSYNTHESIS BI: CPT | Mod: 26,,, | Performed by: RADIOLOGY

## 2025-07-15 PROCEDURE — 77067 SCR MAMMO BI INCL CAD: CPT | Mod: 26,,, | Performed by: RADIOLOGY
